# Patient Record
Sex: MALE | Race: OTHER
[De-identification: names, ages, dates, MRNs, and addresses within clinical notes are randomized per-mention and may not be internally consistent; named-entity substitution may affect disease eponyms.]

---

## 2019-09-19 PROBLEM — Z00.00 ENCOUNTER FOR PREVENTIVE HEALTH EXAMINATION: Status: ACTIVE | Noted: 2019-09-19

## 2019-09-23 ENCOUNTER — APPOINTMENT (OUTPATIENT)
Dept: RHEUMATOLOGY | Facility: CLINIC | Age: 44
End: 2019-09-23

## 2019-10-04 ENCOUNTER — OUTPATIENT (OUTPATIENT)
Dept: OUTPATIENT SERVICES | Facility: HOSPITAL | Age: 44
LOS: 1 days | End: 2019-10-04
Payer: COMMERCIAL

## 2019-10-04 ENCOUNTER — APPOINTMENT (OUTPATIENT)
Dept: RADIOLOGY | Facility: CLINIC | Age: 44
End: 2019-10-04
Payer: COMMERCIAL

## 2019-10-04 DIAGNOSIS — Z00.8 ENCOUNTER FOR OTHER GENERAL EXAMINATION: ICD-10-CM

## 2019-10-04 PROCEDURE — 73120 X-RAY EXAM OF HAND: CPT

## 2019-10-04 PROCEDURE — 73120 X-RAY EXAM OF HAND: CPT | Mod: 26,50

## 2020-11-08 ENCOUNTER — EMERGENCY (EMERGENCY)
Facility: HOSPITAL | Age: 45
LOS: 0 days | Discharge: HOME | End: 2020-11-08
Attending: EMERGENCY MEDICINE
Payer: MEDICAID

## 2020-11-08 VITALS
HEART RATE: 80 BPM | OXYGEN SATURATION: 98 % | DIASTOLIC BLOOD PRESSURE: 77 MMHG | RESPIRATION RATE: 16 BRPM | SYSTOLIC BLOOD PRESSURE: 134 MMHG

## 2020-11-08 VITALS — HEIGHT: 65 IN | WEIGHT: 169.98 LBS

## 2020-11-08 DIAGNOSIS — R50.9 FEVER, UNSPECIFIED: ICD-10-CM

## 2020-11-08 DIAGNOSIS — M79.10 MYALGIA, UNSPECIFIED SITE: ICD-10-CM

## 2020-11-08 DIAGNOSIS — R11.2 NAUSEA WITH VOMITING, UNSPECIFIED: ICD-10-CM

## 2020-11-08 DIAGNOSIS — U07.1 COVID-19: ICD-10-CM

## 2020-11-08 LAB — SARS-COV-2 RNA SPEC QL NAA+PROBE: DETECTED

## 2020-11-08 PROCEDURE — U0003: CPT

## 2020-11-08 PROCEDURE — 99284 EMERGENCY DEPT VISIT MOD MDM: CPT

## 2020-11-08 PROCEDURE — 93010 ELECTROCARDIOGRAM REPORT: CPT

## 2020-11-08 PROCEDURE — 71045 X-RAY EXAM CHEST 1 VIEW: CPT | Mod: 26

## 2020-11-08 PROCEDURE — 71045 X-RAY EXAM CHEST 1 VIEW: CPT

## 2020-11-08 PROCEDURE — 99283 EMERGENCY DEPT VISIT LOW MDM: CPT | Mod: 25

## 2020-11-08 PROCEDURE — 93005 ELECTROCARDIOGRAM TRACING: CPT

## 2020-11-08 RX ORDER — IBUPROFEN 200 MG
1 TABLET ORAL
Qty: 20 | Refills: 0
Start: 2020-11-08 | End: 2020-11-12

## 2020-11-08 RX ORDER — ACETAMINOPHEN 500 MG
2 TABLET ORAL
Qty: 32 | Refills: 0
Start: 2020-11-08 | End: 2020-11-11

## 2020-11-08 RX ORDER — IBUPROFEN 200 MG
600 TABLET ORAL ONCE
Refills: 0 | Status: COMPLETED | OUTPATIENT
Start: 2020-11-08 | End: 2020-11-08

## 2020-11-08 RX ORDER — ONDANSETRON 8 MG/1
1 TABLET, FILM COATED ORAL
Qty: 14 | Refills: 0
Start: 2020-11-08 | End: 2020-11-14

## 2020-11-08 RX ORDER — ACETAMINOPHEN 500 MG
1000 TABLET ORAL ONCE
Refills: 0 | Status: COMPLETED | OUTPATIENT
Start: 2020-11-08 | End: 2020-11-08

## 2020-11-08 RX ADMIN — Medication 1000 MILLIGRAM(S): at 16:01

## 2020-11-08 RX ADMIN — Medication 600 MILLIGRAM(S): at 16:01

## 2020-11-08 NOTE — ED ADULT NURSE NOTE - OBJECTIVE STATEMENT
Patient complains of fever and body aches without other complaints x 5 days.  No acute distress, pt smiling, cooperative.

## 2020-11-08 NOTE — ED PROVIDER NOTE - CLINICAL SUMMARY MEDICAL DECISION MAKING FREE TEXT BOX
Patient with subjective feevr x4 days with body aches and intermittent vomiting. Likely viral syndrome. EKG performed prior to MD evaluation. Told patient that he needs to isolate himself until COVID results are back. Well appearing. Patient with subjective fever x4 days with body aches and intermittent vomiting. Likely viral syndrome. EKG performed prior to MD evaluation. infiltrates on cxr suggestive of covid but pt w/o PNA symptoms. . normal 02 sat at rest & exertion. no cp, no sob. Told patient that he needs to isolate himself until COVID results are back. Well appearing.

## 2020-11-08 NOTE — ED PROVIDER NOTE - NSFOLLOWUPINSTRUCTIONS_ED_ALL_ED_FT
FOLLOW UP WITH PMD  WITHIN 1-2DAYS, CALL TO MAKE APPOINTMENT  COME BACK TO ED IF YOUR CONDITION WORSENS OR IF YOU DEVELOP: FEVER GREATER THAN 105F, fever for more than 5days straight, not being able to drink liquids, CHEST PAIN,  SHORTNESS OF BREATH OR ANY OTHER SYMPTOMS CONCERNING TO YOU  TAKE TYLENOL (ACETAMINOPHEN)  EVERY 6 HOURS AS NEEDED FOR PAIN OR FEVER  TAKE IBUPROFEN (MOTRIN)   EVERY 6 HOURS AS NEEDED FOR PAIN OR FEVER  IF YOU WERE PRESRCIBED ANY MEDICATIONS FROM TODAY'S VISIT, TAKE THEM AS DIRECTED     SEGUIMIENTO CON PMD DENTRO DE 1-2 DÍAS, LLAME PARA HACER JULIANA LATHA  VUELVA A Emergencia si grover condición empeora o si desarrolla: fiebre superior a 105F, fiebre por más de 5 días seguidos, no puede beber líquid,os DOLOR DE PECHO, DIFICULTA AL RESPIRAL O CUALQUIER OTRO SÍNTOMA RELACIONADO CON USTED  TOME TYLENOL (ACETAMINOPHEN)CADA 6 HORAS SEGÚN NECESIDAD DE DOLOR O FIEBRE  BRAYAN IBUPROFENO (MOTRIN)  CADA 6 HORAS SEGÚN NECESIDAD DE DOLOR O FIEBRE  SI FUISTES RECETADO CUALQUIER MEDICAMENTO DE LA VISITA DE BRITT, Tómelos jenny se le indique     ¿Qué es el COVID-19?  La enfermedad por coronavirus 2019, o "COVID-19", es juliana infección ocasionada por un virus específico llamado SARS-CoV-2. El virus apareció por primera vez a fines de 2019 en [place], fred desde entonces se ha propagado velozmente y ahora hay casos en muchos otros lugares, jenny [place] y [place].    Las personas que padecen COVID-19 pueden tener fiebre, tos y dificultad para respirar. Los problemas para respirar aparecen cuando la infección afecta a los pulmones y causa neumonía (figura 1).    Los expertos están estudiando ashley virus y seguirán descubriendo información a medida que pase el tiempo.    ¿Cómo se contagia el COVID-19?  El COVID-19 se contagia principalmente de juliana persona a otra, de manera similar a la gripe. En general, esto ocurre cuando juliana persona enferma tose o estornuda cerca de otras personas. Los médicos también creen que es posible contagiarse al tocar juliana superficie en la que se encuentra el virus y luego tocarse la boca, la nariz o los ojos.    Según los conocimientos de los expertos hasta el momento, al parecer el COVID-19 se contagia más fácilmente cuando hay síntomas presentes. También se puede contagiar sin síntomas, fred los expertos no saben qué brennan común es esto.    ¿Cuáles son los síntomas del COVID-19?  Los síntomas suelen comenzar algunos días después de que la persona se infecta con el virus, fred en algunos casos pueden tardar aún más en aparecer.    Estos pueden ser algunos de los síntomas:    ?Fiebre    ?Tos    ?Sensación de cansancio    ?Dificultad para respirar    ?Angelina musculares    La mayoría de las personas tienen síntomas leves, mientras que algunas personas no tienen ningún síntoma. Sin embargo, en otras personas el COVID-19 puede causar problemas graves jenny neumonía, falta de oxígeno o incluso la muerte. Candelaria Arenas es más común en personas de edad avanzada o que tienen otros problemas de liudmila.    Si derek los niños pueden contraer COVID-19, al parecer es menos probable que tengan síntomas graves.    ¿Yohana consultar a un médico o enfermero?  Si tiene fiebre, tos o dificultad para respirar, y es posible que haya estado expuesto al COVID-19, llame al médico o enfermero. Podría haberse expuesto si ocurrió cualquiera de las siguientes situaciones en los últimos 14 días:    ?Tuvo contacto directo con juliana persona que tiene el virus – Por lo general, esto significa estar a juliana distancia de aproximadamente 6 pies (1,8 metros) de la persona afectada.    ?Vivió en juliana xu donde hay muchas personas que tienen el virus, o viajó a alguna de esas zonas – Los Centros para el Control y la Prevención de Enfermedades ([facility]) de [place] tienen información sobre las zonas afectadas, la cual se puede consultar en grover sitio web: www.cdc.gov/coronavirus/2019-ncov/travelers/index.html.    ?Asistió a un evento o lugar donde hubo casos confirmados de COVID-19 – Por ejemplo, si muchas personas se enfermaron después de asistir a determinada reunión o en grover lugar de trabajo, es posible que usted se haya expuesto.    Si elizabeth síntomas no son graves, es mejor que llame a grover médico, enfermero o clínica antes de ir allí. Ellos le dirán qué hacer y si deben atenderlo en persona. Si necesita ir a la clínica u hospital, tendrá que ponerse juliana máscara. Además, es posible que el personal sanitario le pida que espere en un sitio apartado de otras personas.    Incluso si está gravemente enfermo y tiene que ir a la clínica u [facility], es conveniente que llame antes. De ivan modo, el personal sanitario podrá atenderlo y también xi las medidas necesarias para proteger a los demás.    Grover médico o enfermero le hará un examen y le preguntará sobre elizabeth síntomas. También le hará preguntas sobre los viajes que haya hecho recientemente y si ha estado cerca de alguna persona que podría estar enferma.    ¿Es necesario que me realice pruebas?  Si el médico o enfermero sospecha que usted tiene COVID-19, tomará juliana muestra de fluido del interior de grover nariz y posiblemente de grover boca, y la enviará a un laboratorio para que le jan pruebas. Si tiene tos con moco, también podrían hacerse pruebas con juliana muestra del moco. Estas pruebas permiten determinar si tiene COVID-19 u otra infección.    Además, posiblemente el médico le pida juliana radiografía de tórax o juliana tomografía (TAC) para revisar elizabeth pulmones.    ¿Cómo se trata el COVID-19?  No existe un tratamiento específico para el COVID-19. Muchas personas podrán recuperarse en grover casa, fred aquellas que tengan síntomas graves u otros problemas de liudmila posiblemente tengan que ir al hospital:    ?Enfermedad leve – La mayoría de las personas que tienen el COVID-19 solo se enferman levemente y pueden hacer reposo en grover casa hasta mejorarse. Las personas que tienen síntomas leves parecen mejorar en aproximadamente 2 semanas, fred cada crystal es distinto.    Si se está recuperando del COVID-19, es importante que se quede en grover casa hasta que grover médico o enfermero le diga que es seguro que retome elizabeth actividades normales. La decisión dependerá de cuándo haya comenzado con los síntomas y, en algunos casos, de si se hizo la prueba y el resultado fue negativo (lo que indica que ya no tiene el virus en el cuerpo).    ?Enfermedad grave – Si se enferma más gravemente, es posible que deba recibir tratamiento en el hospital, quizás en la unidad de cuidados intensivos. Mientras esté allí, lo más probable es que permanezca en juliana habitación de "aislamiento" especial. Solo el personal médico tendrá acceso a la habitación, y deberá usar batas, guantes, máscaras y protección ocular especiales.    Los médicos y enfermeros pueden supervisar y complementar la respiración y otras funciones del cuerpo, y darle la mayor comodidad posible. Nathanael vez necesite más oxígeno para que lo ayude a respirar fácilmente. Si tiene mucha dificultad para respirar, quizás sea necesario que lo conecten a un respirador, que es juliana máquina para ayudarlo a respirar.    Los médicos están estudiando varias medicinas para determinar si podrían servir para tratar el COVID-19. En ciertos casos, los médicos podrían recomendar esas medicinas.    ¿Se puede prevenir el COVID-19?  Hay ciertas cosas que puede hacer para disminuir elizabeth posibilidades de contagiarse el COVID-19. Estas medidas son recomendables para todos, en especial ahora que la infección se está propagando muy rápidamente, fred son aun más importantes para las personas mayores de 65 años o con otros problemas de liudmila. Para ayudar a desacelerar la propagación de la infección:    ?Lávese las verenice con agua y jabón frecuentemente. Candelaria Arenas es particularmente importante después de estar en público y tocar a otras personas o superficies. Asegúrese de refregarse las verenice con jabón eleno un mínimo de 20 segundos, y de limpiarse las muñecas, las uñas y la piel entre los dedos. Luego, enjuáguese las verenice y séquelas con juliana toalla de papel que pueda tirar a la basura.    Si no tiene un lavabo cerca, puede limpiarse con un gel para verenice. Los más efectivos son aquellos que contienen un mínimo de 60 % de alcohol, fred lo mejor es lavarse las verenice con jabón y agua, si es posible.    ?Evite tocarse la shari con las verenice, especialmente la boca, la nariz o los ojos.    ?Intente mantenerse lejos de personas que presenten cualquiera de los síntomas de la infección.    ?Evite las multitudes. Si vive en juliana xu donde ha habido casos de COVID-19, trate de quedarse en casa el mayor tiempo posible.    Incluso si no está enfermo, limitar el contacto con otras personas puede ayudar a desacelerar la propagación de la enfermedad. Los expertos lo llaman "distanciamiento social". En general, se recomienda cancelar o postergar las grandes reuniones de personas, jenny los eventos deportivos, los conciertos, los festivales, los desfiles y las bodas. Sin embargo, las reuniones menos concurridas también pueden ser peligrosas. Si necesita estar cerca de otras personas, asegúrese de lavarse las verenice con frecuencia y de evitar el contacto cuando pueda. Por ejemplo, puede evitar los apretones de mano y los choques de bess, y animar a otros a que jan lo mismo.    ?Algunos expertos recomiendan no viajar a determinados países donde hay muchos casos de COVID-19. Las recomendaciones en cuanto a los viajes cambian con frecuencia.    Los expertos no recomiendan que use juliana máscara si no está enfermo, catrina que tenga a grover cuidado a juliana persona que tiene el COVID-19 (o podría tenerlo).    Si vive con alguien que tiene el COVID-19, hay algunas cosas más que puede hacer para protegerse a sí mismo y a los demás:    ?Mantenga al enfermo alejado de otras personas – El enfermo debe tener grover propia habitación y grover propio baño, si es posible. También debe comer en grover propia habitación.    ?Use máscaras – El enfermo debe usar juliana máscara cuando esté en la misma habitación que otras personas. Si usted cuida del enfermo, también puede usar juliana máscara para protegerse cuando estén en la misma habitación. Candelaria Arenas es particularmente importante si la persona enferma no puede usar juliana máscara.    ?Lávese las verenice – Lávese las verenice con agua y jabón frecuentemente (alecia arriba).    ?Limpie con frecuencia – Estas son algunas cosas específicas que pueden ser de ayuda:    •Póngase guantes desechables para limpiar. También es conveniente que se ponga guantes para tocar la ropa sucia, los platos, los utensilios y los desechos de la persona enferma.    •Limpie con frecuencia los objetos que se tocan mucho, jenny las encimeras, las mesas de noche, los picaportes, las computadoras, los teléfonos y las superficies de los trey.    •Limpie los objetos de grover casa con agua y jabón, fred también use desinfectantes en las superficies adecuadas. Algunos productos de limpieza son efectivos para eliminar las bacterias fred no los virus, por lo que es importante leer las etiquetas. La Agencia de Protección Ambiental (Environmental Protection Agency, EPA) de [place] tiene juliana lista de productos aquí: www.epa.gov/pesticide-registration/list-n-disinfectants-use-against-sars-cov-2.    Todavía no existe ninguna vacuna para prevenir el COVID-19.    ¿Qué yohana hacer si hay un brote de COVID-19 en mi región?  Lo mejor que puede hacer para preservar grover liudmila es lavarse las verenice frecuentemente, evitar el contacto cercano con personas enfermas y quedarse en casa si está enfermo. Además, para ayudar a desacelerar la propagación de la enfermedad, es importante seguir todas las instrucciones oficiales que haya en grover región en cuanto a limitar el contacto entre personas. Incluso si no hay casos de COVID-19 donde usted vive, la situación podría cambiar más adelante.    Si hay un brote en grover xu, es probable que las escuelas o los negocios cierren temporalmente, y que muchos eventos se cancelen. Si eso ocurre, o si algún integrante de grover jose contrae COVID-19, es probable que deba quedarse en casa un tiempo. Hay cosas que puede hacer para prepararse para pham posibilidad. Por ejemplo, podría preguntar a grover empleador si puede trabajar desde grover casa o xi juliana licencia, en crystal de ser necesario. Además, puede asegurarse de tener algún modo de contactar a elizabeth parientes, vecinos y otras personas de grover xu para poder intercambiar información fácilmente.    Las normas y las pautas podrían variar de juliana xu a otra. Si los funcionarios de grover xu indican a los habitantes que se queden en grover casa o eviten reunirse con otras personas, es importante xi en suma esta indicación y seguir las instrucciones en la mayor medida posible. Incluso si no corre un riesgo alto de enfermarse gravemente de COVID-19, podría contagiar a otras personas. Mantener la distancia entre las personas es juliana de las mejores formas de controlar la transmisión del virus.    Si usted u otros integrantes de grover jose sienten ansiedad con respecto al COVID-19, recuerde que la mayoría de la gente no se enferma gravemente ni muere a causa de la enfermedad. Si derek es útil estar preparado y hay cosas que puede hacer para disminuir grover riesgo y ayudar a desacelerar la propagación del virus, trate de no entrar en pánico.    ¿Dónde puedo obtener más información?  A medida que se sepa más sobre ashley virus, las recomendaciones de los expertos seguirán cambiando. Consulte a grover médico o funcionario de liudmila pública para contar con la información más actualizada acerca de cómo protegerse.    También puede encontrar más información sobre el COVID-19 en los siguientes sitios web:    ?Centros para el Control y la Prevención de Enfermedades de [place] (CDC): www.cdc.gov/COVID19    ?Organización Mundial de la Liudmila (OMS): www.who.int/emergencies/diseases/novel-coronavirus-2019

## 2020-11-08 NOTE — ED PROVIDER NOTE - OBJECTIVE STATEMENT
Pertinent HPI/PMH/PSH/FHx/SHx and Review of Systems contained within  HPI:  Patient p/w CC of new onset subjective fever with associated body aches, intermittent N/V, gradual onset HA x4 days. Last took Motrin last night. Patient has been tolerating PO intake today. Came to the ED today for further evaluation.   PMH/PSH relevant for:  None   ROS negative for: fever, Chest pain, SOB,  diarrhea, abdominal pain, dysuria    FamilyHx and SocialHx not otherwise contributory Pertinent HPI/PMH/PSH/FHx/SHx and Review of Systems contained within  HPI:  Patient p/w CC of new onset subjective fever with associated body aches, intermittent N/V, gradual onset HA x4 days. Last took Motrin last night. Patient has been tolerating PO intake today. Came to the ED today for further evaluation.   PMH/PSH relevant for:  None   ROS negative for: Chest pain, SOB,  diarrhea, abdominal pain, dysuria, cough, sore throat  FamilyHx and SocialHx not otherwise contributory

## 2020-11-08 NOTE — ED PROVIDER NOTE - CCCP TRG CHIEF CMPLNT
Cyril Bloom. is a 77 y.o. male who presents for routine immunizations. He denies any symptoms , reactions or allergies that would exclude them from being immunized today. Risks and adverse reactions were discussed and the VIS was given to them. All questions were addressed. He was observed for 10 min post injection. There were no reactions observed.     Jessica Shetty LPN fever

## 2020-11-08 NOTE — ED PROVIDER NOTE - PATIENT PORTAL LINK FT
You can access the FollowMyHealth Patient Portal offered by [facility] by registering at the following website: [email or web address]. By joining [employer]’s FollowMyHealth portal, you will also be able to view your health information using other applications (apps) compatible with our system.

## 2020-11-08 NOTE — ED ADULT NURSE NOTE - CHIEF COMPLAINT QUOTE
to ed from home for fevers x4 days and body aches. Denies chest pain, sob, and cough. Known exposure.  Denies travel. Denies pain. Denies taking tylenol, reports taking Advil last night.  id @[id number]

## 2020-11-08 NOTE — ED PROVIDER NOTE - NS ED ROS FT
Review of Systems:  	•	CONSTITUTIONAL: +subjective fever  +body aches   	•	SKIN: no rash  	•	RESPIRATORY: no shortness of breath  	•	CARDIAC: no chest pain, no palpitations  	•	GI:  no abd pain,  +nausea, +vomiting, no diarrhea  	•	GENITO-URINARY:  no dysuria; no hematuria    	•	MUSCULOSKELETAL:  no back pain  	•	NEUROLOGIC: no weakness +HA   	•	ALLERGY: no rhinitis  	•	PSYCHIATRIC: no anxiety

## 2020-11-08 NOTE — ED ADULT TRIAGE NOTE - CHIEF COMPLAINT QUOTE
to ed from home for fevers x4 days and body aches. Denies chest pain, sob, and cough. Known exposure.  Denies travel. Denies pain. Denies taking tylenol, reports taking Advil last night.  id @[id number]

## 2020-11-08 NOTE — ED PROVIDER NOTE - CARE PLAN
Principal Discharge DX:	Viral syndrome  Secondary Diagnosis:	Fever  Secondary Diagnosis:	Infiltrate of lung present on chest x-ray

## 2020-11-21 ENCOUNTER — OUTPATIENT (OUTPATIENT)
Dept: OUTPATIENT SERVICES | Facility: HOSPITAL | Age: 45
LOS: 1 days | End: 2020-11-21
Payer: MEDICARE

## 2020-11-21 DIAGNOSIS — Z11.59 ENCOUNTER FOR SCREENING FOR OTHER VIRAL DISEASES: ICD-10-CM

## 2020-11-21 PROBLEM — Z78.9 OTHER SPECIFIED HEALTH STATUS: Chronic | Status: ACTIVE | Noted: 2020-11-08

## 2020-11-21 LAB — SARS-COV-2 RNA SPEC QL NAA+PROBE: SIGNIFICANT CHANGE UP

## 2020-11-21 PROCEDURE — U0003: CPT

## 2020-11-22 DIAGNOSIS — Z11.59 ENCOUNTER FOR SCREENING FOR OTHER VIRAL DISEASES: ICD-10-CM

## 2021-04-12 ENCOUNTER — EMERGENCY (EMERGENCY)
Facility: HOSPITAL | Age: 46
LOS: 0 days | Discharge: HOME | End: 2021-04-12
Attending: EMERGENCY MEDICINE
Payer: MEDICAID

## 2021-04-12 VITALS
RESPIRATION RATE: 17 BRPM | DIASTOLIC BLOOD PRESSURE: 79 MMHG | SYSTOLIC BLOOD PRESSURE: 109 MMHG | HEART RATE: 59 BPM | OXYGEN SATURATION: 100 % | TEMPERATURE: 98 F

## 2021-04-12 VITALS — HEIGHT: 65 IN | WEIGHT: 179.02 LBS

## 2021-04-12 DIAGNOSIS — E78.5 HYPERLIPIDEMIA, UNSPECIFIED: ICD-10-CM

## 2021-04-12 DIAGNOSIS — M54.9 DORSALGIA, UNSPECIFIED: ICD-10-CM

## 2021-04-12 DIAGNOSIS — M51.26 OTHER INTERVERTEBRAL DISC DISPLACEMENT, LUMBAR REGION: ICD-10-CM

## 2021-04-12 PROCEDURE — 99284 EMERGENCY DEPT VISIT MOD MDM: CPT

## 2021-04-12 PROCEDURE — 96372 THER/PROPH/DIAG INJ SC/IM: CPT

## 2021-04-12 PROCEDURE — 72131 CT LUMBAR SPINE W/O DYE: CPT | Mod: 26

## 2021-04-12 PROCEDURE — 99284 EMERGENCY DEPT VISIT MOD MDM: CPT | Mod: 25

## 2021-04-12 PROCEDURE — 72131 CT LUMBAR SPINE W/O DYE: CPT

## 2021-04-12 RX ORDER — LIDOCAINE 4 G/100G
1 CREAM TOPICAL
Qty: 14 | Refills: 0
Start: 2021-04-12

## 2021-04-12 RX ORDER — CYCLOBENZAPRINE HYDROCHLORIDE 10 MG/1
1 TABLET, FILM COATED ORAL
Qty: 15 | Refills: 0
Start: 2021-04-12

## 2021-04-12 RX ORDER — KETOROLAC TROMETHAMINE 30 MG/ML
30 SYRINGE (ML) INJECTION ONCE
Refills: 0 | Status: DISCONTINUED | OUTPATIENT
Start: 2021-04-12 | End: 2021-04-12

## 2021-04-12 RX ORDER — LIDOCAINE 4 G/100G
1 CREAM TOPICAL ONCE
Refills: 0 | Status: COMPLETED | OUTPATIENT
Start: 2021-04-12 | End: 2021-04-12

## 2021-04-12 RX ORDER — OXYCODONE AND ACETAMINOPHEN 5; 325 MG/1; MG/1
1 TABLET ORAL EVERY 4 HOURS
Refills: 0 | Status: DISCONTINUED | OUTPATIENT
Start: 2021-04-12 | End: 2021-04-12

## 2021-04-12 RX ADMIN — Medication 50 MILLIGRAM(S): at 12:18

## 2021-04-12 RX ADMIN — LIDOCAINE 1 PATCH: 4 CREAM TOPICAL at 13:39

## 2021-04-12 RX ADMIN — Medication 30 MILLIGRAM(S): at 12:18

## 2021-04-12 RX ADMIN — OXYCODONE AND ACETAMINOPHEN 1 TABLET(S): 5; 325 TABLET ORAL at 12:18

## 2021-04-12 NOTE — ED STATDOCS - PATIENT PORTAL LINK FT
You can access the FollowMyHealth Patient Portal offered by [facility] by registering at the following website: [email or web address]. By joining [facility]’s FollowMyHealth portal, you will also be able to view your health information using other applications (apps) compatible with our system.

## 2021-04-12 NOTE — ED STATDOCS - OBJECTIVE STATEMENT
Pt speaks Haitian, provider acted as . 46 y/o male with PMHX of HLD presents to the ED c/o back pain. Pt states he fell on 12/2020 on ice, injured back with no LOC or head strike. Pt states that pain back in 12/2020 improved. Pt reports sudden onset episode of back pain since yesterday. Denies any injuries, trauma, or fall prior to yesterday's back pain. Denies bladder or bowel incontinence, fevers, chills, numbness, tingling, or weakness to BLE.

## 2021-04-12 NOTE — ED STATDOCS - PROGRESS NOTE DETAILS
Pain improved after meds. Waiting on CT read. ~Dheeraj Holly PA-C PA note: All studies reviewed in details with patient. Patient re-examined and re-evaluated. Patient feels much better at this time. ED evaluation, Diagnosis and management discussed with the patient in detail. Workup results discussed with ED attending, OK to [place] home. MRI & close ORTHO SPINE Dr follow up encouraged.  Strict ED return instructions discussed in detail and patient given the opportunity to ask any questions about their discharge diagnosis and instructions. Patient verbalized understanding. ~ Dheeraj Holly PA-C PA: Patient is a 45 year old male with PMHx of back pain who presents to [facility] c/o worsening lower back pain since he fell on ice in DEC, 2020. Patient states he fell on 12/2020 on ice, injured his lower back with no LOC or head strike. Pt states that pain back in 12/2020 improved. Pt reports sudden onset episode of back pain since yesterday. DENIES new injuries, trauma, or fall prior to yesterday's back pain. Denies bladder or bowel incontinence, fevers, chills, numbness, tingling, or weakness to BLE. ~Dheeraj Holly PA-C   Patient seen and evaluated in . Will get CT L-spine, pain control. Reassess. ~Dheeraj Holly PA-C

## 2021-04-12 NOTE — ED ADULT TRIAGE NOTE - CHIEF COMPLAINT QUOTE
Pt comes to the ED complaining of back pain. Pt states that he fell in December at work and injured his back then and was seen here. Pt states that overtime the back pain has gotten progressively worse. Pt states that he is unable to put his shoes on secondary to the pain.

## 2021-04-12 NOTE — ED STATDOCS - NSFOLLOWUPINSTRUCTIONS_ED_ALL_ED_FT
ACUTE LOW BACK PAIN - Ambulatory Care           Dolor antonieta de la parte inferior de la espalda    CUIDADO AMBULATORIO:    El dolor antonieta de la región lumbar de la espaldaes juliana molestia repentina que dura hasta 6 semanas y que dificulta la actividad.    Los síntomas más comunes incluyen los siguientes:  •Rigidez o espasmos      •Dolor hacia abajo o en un lado de corona pierna      •Mantenerse en juliana posición o postura inusual para disminuir el dolor en corona espalda      •No poder encontrar juliana posición cómoda mientras está sentado      •Poco a poco aumento del dolor por 24 o 48 horas después de ejercer tensión en corona espalda      •Molestia en el lumbago o dolor intenso cuando mueve corona espalda      Busque atención médica de inmediato si:  •Usted tiene dolor intenso.      •Usted repentinamente tiene rigidez o siente pesadez en ambos glúteos hacia abajo de ambas piernas.      •Usted tiene entumecimiento o debilidad en juliana pierna o dolor en ambas piernas.      •Usted tiene entumecimiento en el área genital o en la región lumbar.      •Usted no puede controlar corona orina ni elizabeth deposiciones intestinales.      Llame a corona médico si:  •Tiene fiebre.      •Usted tiene un dolor por la noche o cuando descansa.      •Corona dolor no mejora con el tratamiento.      •Usted tiene dolor que empeora cuando tose o estornuda.      •Usted siente un estallido o chasquido repentino en corona espalda.      •Usted tiene preguntas o inquietudes acerca de corona condición o cuidado.      La meta del tratamiento para el dolor de la parte inferior de la espaldaes aliviar el dolor y ayudarlo para que pueda realizar elizabeth actividades habituales. La mayoría de las personas que tienen dolor antonieta de la parte inferior de la espalda se mejoran entre unas 4 a 6 semanas. Es posible que usted necesite alguno de los siguientes:  •Los SANTA,jenny el ibuprofeno, ayudan a disminuir la inflamación, el dolor y la fiebre. Lissett medicamento está disponible con o sin juliana receta médica. Los SANTA pueden causar sangrado estomacal o problemas renales en ciertas personas. Si usted anjelica un medicamento anticoagulante, siempre pregúntele a corona médico si los SANTA son seguros para usted. Siempre ganesh la etiqueta de lissett medicamento y siga las instrucciones.      •Acetaminofénalivia el dolor y baja la fiebre. Está disponible sin receta médica. Pregunte la cantidad y la frecuencia con que debe tomarlos. Siga las indicaciones. Ganesh las etiquetas de todos los demás medicamentos que esté usando para saber si también contienen acetaminofén, o pregunte a corona médico o farmacéutico. El acetaminofén puede causar daño en el hígado cuando no se anjelica de forma correcta. No use más de 4 gramos (4000 miligramos) en total de acetaminofeno en un día.      •Relajantes muscularesdisminuyen el dolor y relajan los músculos de la parte inferior de la columna.      •Puede administrarsepodrían administrarse. Pregunte al médico cómo debe xi lissett medicamento de forma simmons. Algunos medicamentos recetados para el dolor contienen acetaminofén. No tome otros medicamentos que contengan acetaminofén sin consultarlo con corona médico. Demasiado acetaminofeno puede causar daño al hígado. Los medicamentos recetados para el dolor podrían causar estreñimiento. Pregunte a corona médico jenny prevenir o tratar estreñimiento.      El manejo de elizabeth síntomas:  •Manténgase activolo más que pueda sin causar más dolor. El reposo en cama puede empeorar corona dolor de espalda. Comience con ejercicios ligeros jenny caminar. Evite levantar objetos hasta que ya no tenga dolor. Solicite más información acerca de las actividades físicas o plan de ejercicios que son los adecuados para usted.  Deep diversa caminando jenny ejercicio           •Aplique caloren la espalda de 20 a 30 minutos cada 2 horas por los días que le indiquen. El calor ayuda a disminuir el dolor y los espasmos musculares. Alterne entre el calor y el hielo.      •Aplique hieloen la espalda de 15 a 20 minutos cada hora o jenny se le indique. Use juliana compresa de hielo o ponga hielo triturado en juliana bolsa de plástico. Cúbralo con juliana toalla antes de aplicarlo sobre corona piel. El hielo ayuda a evitar daño al tejido y a disminuir la inflamación y el dolor.      Prevenir el dolor antonieta de la parte inferior de la espalda:  •Use la mecánica corporal adecuada.?Flexione la cadera y las rodillas cuando vaya a levantar un objeto. No doble la cintura. Utilice los músculos de las piernas mientras levanta corona carga. No use corona espalda. Mantenga el objeto cerca de corona pecho mientras lo levanta. No se tuerza, ni levante cualquier cosa por encima de corona cintura.      ?Cambie corona posición frecuentemente cuando pase mucho tiempo de pie. Descanse un pie sobre juliana caja pequeña o un reposapiés e intercambie con el otro pie frecuentemente.      ?No permanezca sentado por lapsos de tiempo prolongados. Cuando sea necesario hacerlo, siéntese en juliana silla de respaldo recto con los pies apoyados en el suelo. Nunca alcance, jale ni empuje mientras se encuentra sentando.      •Preston ejercicios que fortalezcan elizabeth músculos de la espalda.Entre en calor antes de hacer ejercicio. Consulte con corona médico sobre el mejor plan de ejercicios para usted.      •Mantenga un peso saludable.Pregúntele a corona médico cuál es el peso ideal para usted. Pídale que lo ayude a crear un plan para bajar de peso si tiene sobrepeso.      Acuda a la consulta de control con corona médico según las indicaciones:Regrese a juliana joanna de seguimiento si usted aún tiene dolor después de 1 a 3 semanas de tratamiento. Nathanael vez necesite acudir con un ortopedista si corona dolor de espalda dura más de 12 semanas. Anote elizabeth preguntas para que se acuerde de hacerlas eleno elizabeth visitas.                  MAGNETIC RESONANCE IMAGING - General Information           Imagen por resonancia magnética    LO QUE NECESITA SABER:    ¿Qué necesito saber acerca de juliana imagen por resonancia magnética (IRM)?Juliana IRM es un examen que utiliza artis magnéticos y ondas de radio para xi imágenes del interior de corona cuerpo. Juliana IRM se utiliza para alecia los vasos sanguíneos, el tejido, los músculos y los huesos. También puede mostrar órganos, jenny corona corazón, pulmones o hígado. Juliana IRM puede ayudar a que corona médico diagnostique o trate juliana condición médica. No utiliza radiación.    ¿Cómo me preparo para juliana IRM?  •Corona médico le indicará los medicamentos que puede xi o no el día de la IRM. Le podría administrar medicamento para ayudarlo a sentirse calmado y relajado eleno la IRM.      •Informe a corona médico si usted sabe o piensa que está embarazada.      •Dígale a corona médico si usted tiene algún metal dentro de corona cuerpo, jenny un marcapasos, implante o pinza de aneurisma. Dígale si usted tiene un tatuaje o usa un parche medicinal.      •Quítese cualquier artículo de metal que lleve puesto, jenny broches para el sana, joyería, anteojos, audífonos o dientes postizos antes de entrar a la habitación de IRM.      ¿Qué sucederá eleno juliana IRM?Corona médico le pedirá que se acueste sobre juliana mayito. Podría usarse líquido de contraste para ayudar a que juliana parte de corona cuerpo se tyrese más claramente. La mayito será deslizada hacia un espacio abierto en el centro de la máquina. Usted tendrá que permanecer quieto eleno la IRM. Es normal escuchar ruidos de golpes, estruendos o de clics en la máquina.              ¿Cuáles son los riesgos de juliana IRM?Juliana IRM podría provocar que un objeto de metal en corona cuerpo se salga de corona lugar y provoque lesiones serias o que deje de funcionar apropiadamente. El líquido de contraste podría provocar náusea, dolor de lurdes, desvanecimiento o dolor en el área de la inyección. Usted también podría sufrir juliana reacción alérgica al líquido de contraste.    ACUERDOS SOBRE CORONA CUIDADO:    Usted tiene el derecho de ayudar a planear corona cuidado. Aprenda todo lo que pueda sobre corona condición y jenny darle tratamiento. Discuta elizabeth opciones de tratamiento con elizabeth médicos para decidir el cuidado que usted desea recibir. Usted siempre tiene el derecho de rechazar el tratamiento. Hernia de disco    Herniated Disk       Se produce juliana hernia de disco cuando un disco de la columna vertebral sobresale. Hay un disco ovalado entre cada par de huesos (vértebras) de la columna vertebral. Los discos conectan los huesos, ayudan a que la columna vertebral se mueva y evitan que los huesos se rocen entre sí al moverse.    Juliana hernia de disco puede producirse en cualquier parte de la espalda o del zoe. Angelique siempre afecta la parte inferior de la espalda.      ¿Cuáles son las causas?  Esta afección puede ser causada por lo siguiente:  •Desgaste a medida que envejece.      •Lesión repentina, jenny juliana distensión o un esguince.        ¿Qué incrementa el riesgo?  Los siguientes factores pueden hacer que sea más propenso a desarrollar esta afección:  •Envejecimiento. Lissett es el factor principal que aumenta el riesgo.      •Ser un hombre de 30 a 50 años de edad.      •Realizar actividades en las que tenga que levantar peso, inclinarse o girar con frecuencia.      •No hacer suficiente ejercicio físico.      •Tener sobrepeso.      •Usar productos que contienen tabaco.        ¿Cuáles son los signos o síntomas?  Los síntomas pueden variar según el lugar del cuerpo donde esté la hernia de disco.•Si la hernia está en la parte inferior de la espalda, quizá sienta un dolor antonieta en las siguientes zonas:  •Parte del brazo, la pierna, la cadera o las nalgas.      •Danielle posterior de la parte inferior de la pierna (pantorrilla).      •Parte inferior de la espalda que se extiende por la pierna hasta el pie (ciática).      •Juliana hernia de disco en el zoe puede hacer que se sienta mareado y que sienta que las cosas que lo rodean se mueven cuando en realidad no lo hacen (vértigo). También podría provocar dolor o debilidad en las siguientes zonas:  •El zoe.      •Los omóplatos.      •La parte superior del brazo, la parte inferior del brazo o los dedos.      También puede tener debilidad muscular. Puede ser difícil:  •Levantar la pierna o el brazo.      •Pararse en puntas de pie.      •Apretar con fuerza con juliana de las verenice.    Otros síntomas pueden incluir:  •Pérdida de la sensibilidad (adormecimiento) u hormigueo en las zonas afectadas de las verenice, los brazos, los pies o las piernas.      •No poder contolar cuando hace pis (orina) o cuando defeca (deposiciones). Velma es poco frecuente fred grave. Es posible que tenga juliana hernia de disco muy grave en la parte inferior de la espalda.        ¿Cómo se trata?  El tratamiento de esta afección puede incluir:•Un corto período de reposo. Por lo general, lissett es el primer tratamiento.  •Podría tener que hacer reposo en cama eleno hasta 2 días, o podrían indicarle que se quede en corona casa y evite realizar actividad.      •Si tiene juliana hernia de disco en la parte inferior de la espalda, limite el tiempo que está sentado. Al estar sentado, se ejerce más presión sobre el disco.      •Medicamentos. Estos medicamentos pueden incluir:  •Antiinflamatorios no esteroideos (SANTA), jenny ibuprofeno, para ayudar a reducir el dolor y la hinchazón.      •Relajantes musculares para que los músculos de la espalda no se tensen de repente (espasmo).      •Analgésicos recetados, si tiene un dolor muy intenso.        •Terapia con hielo o con calor.      •Inyecciones de corticoesteroides en la danielle de la hernia de disco. Estos ayudan a reducir la hinchazón y el dolor.      •Fisioterapia para fortalecer los músculos de la espalda.      En muchos casos, los síntomas desaparecen con el tratamiento después de algunos días o semanas. Lo más probable es que después de 3 o 4 meses ya no tenga síntomas. Si otros tratamientos no resultan eficaces, brenden vez deba someterse a juliana cirugía.      Siga estas instrucciones en corona casa:    Medicamentos     •Use los medicamentos de venta sachin y los recetados solamente jenny se lo haya indicado el médico.    •Consulte a corona médico si el medicamento que le recetaron:  •Hace necesario que evite conducir o usar maquinaria pesada.    •Puede causarle dificultad para defecar (estreñimiento). Es posible que deba xi medidas para prevenir o tratar los problemas para defecar:  •Beber suficiente líquido para mantener el pis (la orina) de color amarillo pálido.      •Usar medicamentos recetados o de venta sachin.      •Valencia alimentos ricos en fibra. Entre ellos, frijoles, cereales integrales y frutas y verduras frescas.      •Limitar los alimentos con alto contenido de grasas y azúcares procesados. Estos incluyen alimentos fritos o dulces.            Control del dolor, la rigidez y la hinchazón                 •Si se lo indican, aplique hielo sobre la danielle dolorida. Para hacer esto:  •Ponga el hielo en juliana bolsa plástica.      •Coloque juliana toalla entre la piel y la bolsa.      •Coloque el hielo eleno 20 minutos, 2 a 3 veces por día.      •Si se lo indican, aplique calor sobre la danielle dolorida. Hágalo con la frecuencia que le haya indicado el médico. Use la genna de calor que el médico le recomiende, jenny juliana compresa de calor húmedo o juliana almohadilla térmica.  •Coloque juliana toalla entre la piel y la genna de calor.      •Aplique calor eleno 20 a 30 minutos.      •Retire la genna de calor si la piel se pone de color crews brillante. Velma es muy importante si no puede sentir dolor, calor o frío. Puede correr un riesgo mayor de sufrir quemaduras.        Actividad     •Preston reposo jenny se lo haya indicado el médico.    •Después del período de reposo:  •Retome elizabeth actividades normales según lo indicado por el médico. Comience a hacer ejercicios de a poco jenny se lo hayan indicado. Pregúntele al médico qué actividades y ejercicios son seguros para usted.      •Mantenga juliana buena postura.      •Evite los movimientos que le causen dolor.      •No levante ningún objeto que pese más de 10 libras (4.5 kg) o el límite de peso que le indiquen hasta que el médico le diga que puede hacerlo.      •No se siente ni esté de pie eleno un período prolongado sin moverse.      •No se siente eleno un período prolongado sin levantarse y moverse.        •Si le indicaron hacer ejercicios (fisioterapia), hágalos jenny se lo haya indicado el médico.      •Intente fortalecer la espalda y el vientre (abdomen) con ejercicios jenny nadar o caminar.      Instrucciones generales     • No consuma ningún producto que contenga nicotina o tabaco, jenny cigarrillos, cigarrillos electrónicos y tabaco de mascar. Si necesita ayuda para dejar de consumir estos productos, consulte al médico.      • No use zapatos con tacones altos.      • No duerma boca abajo.      •Si tiene sobrepeso, trabaje con el médico para adelgazar sin riegos.      •Concurra a todas visitas de seguimiento jenny se lo haya indicado el médico. Velma es importante.        ¿Cómo se previene?     •Mantenga un peso saludable.      •Mantenerse en forma. Preston por lo menos 150 minutos de ejercicios de intensidad moderada cada semana, jenny caminar rápidamente o hacer gimnasia acuática.    •Al levantar objetos:  •Mantenga los pies separados el ancho de los hombros o más.      •Endurezca los músculos del abdomen.      •Flexione las rodillas y la cadera, y mantenga la columna en posición neutral. Levante los objetos utilizando la fuerza de las piernas, no de la espalda. No trabe las rodillas hacia afuera.      •Siempre pida ayuda a otra persona para levantar objetos pesados o incómodos.          Comuníquese con un médico si:    •Tiene dolor de espalda o de zoe que no se jose después de 6 semanas.      •Tiene un dolor muy intenso en la espalda, el zoe, las piernas o los brazos.    •Tiene cualquiera de estos problemas en cualquier parte del cuerpo:  •Hormigueo.      •Debilidad.      •Pérdida de la sensibilidad.          Solicite ayuda de inmediato si:    •No puede  los brazos o las piernas.      •No puede controlar cuando hace pis o cuando defeca.      •Siente mareos.      •Se desmaya.      •Tiene dificultad para respirar.        Resumen    •Juliana hernia de disco se produce cuando un disco de la columna sobresale.      •Esta afección puede deberse al desgaste a medida que envejece o a juliana lesión repentina.      •Los síntomas pueden variar según el lugar del cuerpo donde esté la hernia de disco.      •El tratamiento puede incluir reposo, medicamentos, terapia con calor o hielo, inyecciones de corticoesteroides y ejercicios.      •Si otros tratamientos no son eficaces, brenden vez deba someterse a juliana cirugía.      Esta información no tiene jenny fin reemplazar el consejo del médico. Asegúrese de hacerle al médico cualquier pregunta que tenga.          ACUTE LOW BACK PAIN - Ambulatory Care       Dolor antonieta de la parte inferior de la espalda    CUIDADO AMBULATORIO:    El dolor antonieta de la región lumbar de la espaldaes juliana molestia repentina que dura hasta 6 semanas y que dificulta la actividad.    Los síntomas más comunes incluyen los siguientes:  •Rigidez o espasmos      •Dolor hacia abajo o en un lado de corona pierna      •Mantenerse en juliana posición o postura inusual para disminuir el dolor en corona espalda      •No poder encontrar juliana posición cómoda mientras está sentado      •Poco a poco aumento del dolor por 24 o 48 horas después de ejercer tensión en corona espalda      •Molestia en el lumbago o dolor intenso cuando mueve corona espalda      Busque atención médica de inmediato si:  •Usted tiene dolor intenso.      •Usted repentinamente tiene rigidez o siente pesadez en ambos glúteos hacia abajo de ambas piernas.      •Usted tiene entumecimiento o debilidad en juliana pierna o dolor en ambas piernas.      •Usted tiene entumecimiento en el área genital o en la región lumbar.      •Usted no puede controlar corona orina ni elizabeth deposiciones intestinales.      Llame a corona médico si:  •Tiene fiebre.      •Usted tiene un dolor por la noche o cuando descansa.      •Corona dolor no mejora con el tratamiento.      •Usted tiene dolor que empeora cuando tose o estornuda.      •Usted siente un estallido o chasquido repentino en corona espalda.      •Usted tiene preguntas o inquietudes acerca de corona condición o cuidado.      La meta del tratamiento para el dolor de la parte inferior de la espaldaes aliviar el dolor y ayudarlo para que pueda realizar elizabeth actividades habituales. La mayoría de las personas que tienen dolor antonieta de la parte inferior de la espalda se mejoran entre unas 4 a 6 semanas. Es posible que usted necesite alguno de los siguientes:  •Los SANTA,jenny el ibuprofeno, ayudan a disminuir la inflamación, el dolor y la fiebre. Lissett medicamento está disponible con o sin juliana receta médica. Los SANTA pueden causar sangrado estomacal o problemas renales en ciertas personas. Si usted anjelica un medicamento anticoagulante, siempre pregúntele a corona médico si los SANTA son seguros para usted. Siempre ganesh la etiqueta de lissett medicamento y siga las instrucciones.      •Acetaminofénalivia el dolor y baja la fiebre. Está disponible sin receta médica. Pregunte la cantidad y la frecuencia con que debe tomarlos. Siga las indicaciones. Ganesh las etiquetas de todos los demás medicamentos que esté usando para saber si también contienen acetaminofén, o pregunte a corona médico o farmacéutico. El acetaminofén puede causar daño en el hígado cuando no se anjelica de forma correcta. No use más de 4 gramos (4000 miligramos) en total de acetaminofeno en un día.      •Relajantes muscularesdisminuyen el dolor y relajan los músculos de la parte inferior de la columna.      •Puede administrarsepodrían administrarse. Pregunte al médico cómo debe xi lissett medicamento de forma simmons. Algunos medicamentos recetados para el dolor contienen acetaminofén. No tome otros medicamentos que contengan acetaminofén sin consultarlo con corona médico. Demasiado acetaminofeno puede causar daño al hígado. Los medicamentos recetados para el dolor podrían causar estreñimiento. Pregunte a corona médico jenny prevenir o tratar estreñimiento.      El manejo de elizabeth síntomas:  •Manténgase activolo más que pueda sin causar más dolor. El reposo en cama puede empeorar corona dolor de espalda. Comience con ejercicios ligeros jenny caminar. Evite levantar objetos hasta que ya no tenga dolor. Solicite más información acerca de las actividades físicas o plan de ejercicios que son los adecuados para usted.  Deep diversa caminando jenny ejercicio           •Aplique caloren la espalda de 20 a 30 minutos cada 2 horas por los días que le indiquen. El calor ayuda a disminuir el dolor y los espasmos musculares. Alterne entre el calor y el hielo.      •Aplique hieloen la espalda de 15 a 20 minutos cada hora o jenny se le indique. Use juliana compresa de hielo o ponga hielo triturado en juliana bolsa de plástico. Cúbralo con juliana toalla antes de aplicarlo sobre corona piel. El hielo ayuda a evitar daño al tejido y a disminuir la inflamación y el dolor.      Prevenir el dolor antonieta de la parte inferior de la espalda:  •Use la mecánica corporal adecuada.?Flexione la cadera y las rodillas cuando vaya a levantar un objeto. No doble la cintura. Utilice los músculos de las piernas mientras levanta corona carga. No use corona espalda. Mantenga el objeto cerca de corona pecho mientras lo levanta. No se tuerza, ni levante cualquier cosa por encima de corona cintura.      ?Cambie corona posición frecuentemente cuando pase mucho tiempo de pie. Descanse un pie sobre juliana caja pequeña o un reposapiés e intercambie con el otro pie frecuentemente.      ?No permanezca sentado por lapsos de tiempo prolongados. Cuando sea necesario hacerlo, siéntese en juliana silla de respaldo recto con los pies apoyados en el suelo. Nunca alcance, jale ni empuje mientras se encuentra sentando.      •Preston ejercicios que fortalezcan elizabeth músculos de la espalda.Entre en calor antes de hacer ejercicio. Consulte con corona médico sobre el mejor plan de ejercicios para usted.      •Mantenga un peso saludable.Pregúntele a corona médico cuál es el peso ideal para usted. Pídale que lo ayude a crear un plan para bajar de peso si tiene sobrepeso.      Acuda a la consulta de control con corona médico según las indicaciones:Regrese a juliana joanna de seguimiento si usted aún tiene dolor después de 1 a 3 semanas de tratamiento. Brenden vez necesite acudir con un ortopedista si corona dolor de espalda dura más de 12 semanas. Anote elizabeth preguntas para que se acuerde de hacerlas eleno elizabeth visitas.                  MAGNETIC RESONANCE IMAGING - General Information           Imagen por resonancia magnética    LO QUE NECESITA SABER:    ¿Qué necesito saber acerca de juliana imagen por resonancia magnética (IRM)?Juliana IRM es un examen que utiliza artis magnéticos y ondas de radio para xi imágenes del interior de corona cuerpo. Juliana IRM se utiliza para alecia los vasos sanguíneos, el tejido, los músculos y los huesos. También puede mostrar órganos, jenny corona corazón, pulmones o hígado. Juliana IRM puede ayudar a que corona médico diagnostique o trate juliana condición médica. No utiliza radiación.    ¿Cómo me preparo para juliana IRM?  •Corona médico le indicará los medicamentos que puede xi o no el día de la IRM. Le podría administrar medicamento para ayudarlo a sentirse calmado y relajado eleno la IRM.      •Informe a corona médico si usted sabe o piensa que está embarazada.      •Dígale a corona médico si usted tiene algún metal dentro de corona cuerpo, jenny un marcapasos, implante o pinza de aneurisma. Dígale si usted tiene un tatuaje o usa un parche medicinal.      •Quítese cualquier artículo de metal que lleve puesto, jenny broches para el sana, joyería, anteojos, audífonos o dientes postizos antes de entrar a la habitación de IRM.      ¿Qué sucederá eleno juliana IRM?Corona médico le pedirá que se acueste sobre juliana mayito. Podría usarse líquido de contraste para ayudar a que juliana parte de corona cuerpo se tyrese más claramente. La mayito será deslizada hacia un espacio abierto en el centro de la máquina. Usted tendrá que permanecer quieto eleno la IRM. Es normal escuchar ruidos de golpes, estruendos o de clics en la máquina.              ¿Cuáles son los riesgos de juliana IRM?Juliana IRM podría provocar que un objeto de metal en corona cuerpo se salga de corona lugar y provoque lesiones serias o que deje de funcionar apropiadamente. El líquido de contraste podría provocar náusea, dolor de lurdes, desvanecimiento o dolor en el área de la inyección. Usted también podría sufrir juliana reacción alérgica al líquido de contraste.    ACUERDOS SOBRE CORONA CUIDADO:    Usted tiene el derecho de ayudar a planear corona cuidado. Aprenda todo lo que pueda sobre corona condición y jenny darle tratamiento. Discuta elizabeth opciones de tratamiento con elizabeth médicos para decidir el cuidado que usted desea recibir. Usted siempre tiene el derecho de rechazar el tratamiento.

## 2021-04-12 NOTE — ED STATDOCS - MUSCULOSKELETAL, MLM
range of motion is not limited +pain to lower back with palpation. Pain in lower back with leg extension. Pain does not radiate to BLE. Motor 5/5

## 2021-04-12 NOTE — ED STATDOCS - CARE PLAN
Principal Discharge DX:	Low back pain   Principal Discharge DX:	Low back pain  Secondary Diagnosis:	Herniated intervertebral disc of lumbar spine

## 2021-04-12 NOTE — ED STATDOCS - PHYSICAL EXAMINATION
PA NOTE: GEN: AOX3, NAD. HEENT: Throat clear. Airway is patent. EYES: PERRLA. EOMI. Head: NC/AT. NECK: Supple, No JVD. FROM. C-spine non-tender. CV:S1S2, RRR, LUNGS: Non-labored breathing, no tachypnea. O2sat 100% RA. CTA b/l. No w/r/r. CHEST: Equal chest expansion and rise. No deformity. ABD: Soft, NT/ND, no rebound, no guarding. No CVAT. BACK: +Muscular spasm noted lumbar paravertebral area. +MSK tenderness. Painful flexion/extension. No bony deformity. NVI. 2+ reflexes. EXT: No e/c/c. 2+ distal pulses. SKIN: No rashes. NEURO: No focal deficits. CN II-XII intact. FROM. 5/5 motor and sensory. ~Dheeraj Holly PA-C

## 2021-04-12 NOTE — ED STATDOCS - NSFOLLOWUPCLINICS_GEN_ALL_ED_FT
[facility]  Family Medicine  [address] [place] [address]  Phone: [phone]  Fax:

## 2021-04-12 NOTE — ED STATDOCS - CLINICAL SUMMARY MEDICAL DECISION MAKING FREE TEXT BOX
CT scan and pain control. Pt with most likely disc herniation, f/u with physician at [facility]. CT scan and pain control. Pt with most likely disc herniation, f/u with physician at [facility].    PA note: All studies reviewed in details with patient. Patient re-examined and re-evaluated. Patient feels much better at this time. ED evaluation, Diagnosis and management discussed with the patient in detail. Workup results discussed with ED attending, OK to dc home. MRI & close ORTHO SPINE Dr follow up encouraged.  Strict ED return instructions discussed in detail and patient given the opportunity to ask any questions about their discharge diagnosis and instructions. Patient verbalized understanding. ~ Dheeraj Holly PA-C

## 2021-04-12 NOTE — ED STATDOCS - CARE PROVIDER_API CALL
Tj Ayala)  Orthopaedic Surgery  [address] [place] [address]  Phone: [phone]  Fax: [phone]  Follow Up Time: Routine

## 2021-04-12 NOTE — ED ADULT NURSE NOTE - OBJECTIVE STATEMENT
Patient presents to the ED c/o back pain. Pt states he fell on 12/2020 on ice, injured back with no LOC or head strike. Pt states that pain back in 12/2020 improved. Pt reports sudden onset episode of back pain since yesterday. Denies any injuries, trauma, or fall prior to yesterday's back pain. Denies bladder or bowel incontinence, fevers, chills, numbness, tingling, or weakness to BLE.

## 2021-11-14 ENCOUNTER — EMERGENCY (EMERGENCY)
Facility: HOSPITAL | Age: 46
LOS: 0 days | Discharge: HOME | End: 2021-11-14
Attending: FAMILY MEDICINE
Payer: MEDICAID

## 2021-11-14 VITALS
OXYGEN SATURATION: 98 % | RESPIRATION RATE: 17 BRPM | TEMPERATURE: 98 F | HEART RATE: 72 BPM | SYSTOLIC BLOOD PRESSURE: 110 MMHG | DIASTOLIC BLOOD PRESSURE: 82 MMHG

## 2021-11-14 VITALS — WEIGHT: 169.98 LBS | HEIGHT: 65 IN

## 2021-11-14 DIAGNOSIS — K59.00 CONSTIPATION, UNSPECIFIED: ICD-10-CM

## 2021-11-14 DIAGNOSIS — I10 ESSENTIAL (PRIMARY) HYPERTENSION: ICD-10-CM

## 2021-11-14 DIAGNOSIS — R14.0 ABDOMINAL DISTENSION (GASEOUS): ICD-10-CM

## 2021-11-14 DIAGNOSIS — K57.92 DIVERTICULITIS OF INTESTINE, PART UNSPECIFIED, WITHOUT PERFORATION OR ABSCESS WITHOUT BLEEDING: ICD-10-CM

## 2021-11-14 LAB
ALBUMIN SERPL ELPH-MCNC: 4 G/DL — SIGNIFICANT CHANGE UP (ref 3.3–5)
ALP SERPL-CCNC: 87 U/L — SIGNIFICANT CHANGE UP (ref 40–120)
ALT FLD-CCNC: 42 U/L — SIGNIFICANT CHANGE UP (ref 12–78)
ANION GAP SERPL CALC-SCNC: 6 MMOL/L — SIGNIFICANT CHANGE UP (ref 5–17)
APPEARANCE UR: CLEAR — SIGNIFICANT CHANGE UP
AST SERPL-CCNC: 16 U/L — SIGNIFICANT CHANGE UP (ref 15–37)
BASOPHILS # BLD AUTO: 0.03 K/UL — SIGNIFICANT CHANGE UP (ref 0–0.2)
BASOPHILS NFR BLD AUTO: 0.3 % — SIGNIFICANT CHANGE UP (ref 0–2)
BILIRUB SERPL-MCNC: 0.5 MG/DL — SIGNIFICANT CHANGE UP (ref 0.2–1.2)
BILIRUB UR-MCNC: NEGATIVE — SIGNIFICANT CHANGE UP
BUN SERPL-MCNC: 11 MG/DL — SIGNIFICANT CHANGE UP (ref 7–23)
CALCIUM SERPL-MCNC: 8.8 MG/DL — SIGNIFICANT CHANGE UP (ref 8.5–10.1)
CHLORIDE SERPL-SCNC: 103 MMOL/L — SIGNIFICANT CHANGE UP (ref 96–108)
CO2 SERPL-SCNC: 29 MMOL/L — SIGNIFICANT CHANGE UP (ref 22–31)
COLOR SPEC: YELLOW — SIGNIFICANT CHANGE UP
CREAT SERPL-MCNC: 0.89 MG/DL — SIGNIFICANT CHANGE UP (ref 0.5–1.3)
DIFF PNL FLD: ABNORMAL
EOSINOPHIL # BLD AUTO: 0.11 K/UL — SIGNIFICANT CHANGE UP (ref 0–0.5)
EOSINOPHIL NFR BLD AUTO: 1 % — SIGNIFICANT CHANGE UP (ref 0–6)
GLUCOSE SERPL-MCNC: 92 MG/DL — SIGNIFICANT CHANGE UP (ref 70–99)
GLUCOSE UR QL: NEGATIVE MG/DL — SIGNIFICANT CHANGE UP
HCT VFR BLD CALC: 45.3 % — SIGNIFICANT CHANGE UP (ref 39–50)
HGB BLD-MCNC: 15.3 G/DL — SIGNIFICANT CHANGE UP (ref 13–17)
IMM GRANULOCYTES NFR BLD AUTO: 0.2 % — SIGNIFICANT CHANGE UP (ref 0–1.5)
KETONES UR-MCNC: NEGATIVE — SIGNIFICANT CHANGE UP
LACTATE SERPL-SCNC: 0.7 MMOL/L — SIGNIFICANT CHANGE UP (ref 0.7–2)
LEUKOCYTE ESTERASE UR-ACNC: NEGATIVE — SIGNIFICANT CHANGE UP
LIDOCAIN IGE QN: 72 U/L — LOW (ref 73–393)
LYMPHOCYTES # BLD AUTO: 1.57 K/UL — SIGNIFICANT CHANGE UP (ref 1–3.3)
LYMPHOCYTES # BLD AUTO: 14.3 % — SIGNIFICANT CHANGE UP (ref 13–44)
MCHC RBC-ENTMCNC: 29.4 PG — SIGNIFICANT CHANGE UP (ref 27–34)
MCHC RBC-ENTMCNC: 33.8 GM/DL — SIGNIFICANT CHANGE UP (ref 32–36)
MCV RBC AUTO: 87.1 FL — SIGNIFICANT CHANGE UP (ref 80–100)
MONOCYTES # BLD AUTO: 1.14 K/UL — HIGH (ref 0–0.9)
MONOCYTES NFR BLD AUTO: 10.4 % — SIGNIFICANT CHANGE UP (ref 2–14)
NEUTROPHILS # BLD AUTO: 8.1 K/UL — HIGH (ref 1.8–7.4)
NEUTROPHILS NFR BLD AUTO: 73.8 % — SIGNIFICANT CHANGE UP (ref 43–77)
NITRITE UR-MCNC: NEGATIVE — SIGNIFICANT CHANGE UP
PH UR: 6.5 — SIGNIFICANT CHANGE UP (ref 5–8)
PLATELET # BLD AUTO: 237 K/UL — SIGNIFICANT CHANGE UP (ref 150–400)
POTASSIUM SERPL-MCNC: 3.9 MMOL/L — SIGNIFICANT CHANGE UP (ref 3.5–5.3)
POTASSIUM SERPL-SCNC: 3.9 MMOL/L — SIGNIFICANT CHANGE UP (ref 3.5–5.3)
PROT SERPL-MCNC: 7.8 GM/DL — SIGNIFICANT CHANGE UP (ref 6–8.3)
PROT UR-MCNC: NEGATIVE MG/DL — SIGNIFICANT CHANGE UP
RBC # BLD: 5.2 M/UL — SIGNIFICANT CHANGE UP (ref 4.2–5.8)
RBC # FLD: 12.8 % — SIGNIFICANT CHANGE UP (ref 10.3–14.5)
SODIUM SERPL-SCNC: 138 MMOL/L — SIGNIFICANT CHANGE UP (ref 135–145)
SP GR SPEC: 1.01 — SIGNIFICANT CHANGE UP (ref 1.01–1.02)
UROBILINOGEN FLD QL: NEGATIVE MG/DL — SIGNIFICANT CHANGE UP
WBC # BLD: 10.97 K/UL — HIGH (ref 3.8–10.5)
WBC # FLD AUTO: 10.97 K/UL — HIGH (ref 3.8–10.5)

## 2021-11-14 PROCEDURE — 83605 ASSAY OF LACTIC ACID: CPT

## 2021-11-14 PROCEDURE — 87086 URINE CULTURE/COLONY COUNT: CPT

## 2021-11-14 PROCEDURE — 99285 EMERGENCY DEPT VISIT HI MDM: CPT

## 2021-11-14 PROCEDURE — 74177 CT ABD & PELVIS W/CONTRAST: CPT | Mod: 26,ME

## 2021-11-14 PROCEDURE — 87040 BLOOD CULTURE FOR BACTERIA: CPT

## 2021-11-14 PROCEDURE — 99284 EMERGENCY DEPT VISIT MOD MDM: CPT | Mod: 25

## 2021-11-14 PROCEDURE — 36415 COLL VENOUS BLD VENIPUNCTURE: CPT

## 2021-11-14 PROCEDURE — 86901 BLOOD TYPING SEROLOGIC RH(D): CPT

## 2021-11-14 PROCEDURE — 86900 BLOOD TYPING SEROLOGIC ABO: CPT

## 2021-11-14 PROCEDURE — 80053 COMPREHEN METABOLIC PANEL: CPT

## 2021-11-14 PROCEDURE — 86850 RBC ANTIBODY SCREEN: CPT

## 2021-11-14 PROCEDURE — G1004: CPT

## 2021-11-14 PROCEDURE — 74177 CT ABD & PELVIS W/CONTRAST: CPT | Mod: ME

## 2021-11-14 PROCEDURE — 85025 COMPLETE CBC W/AUTO DIFF WBC: CPT

## 2021-11-14 PROCEDURE — 81001 URINALYSIS AUTO W/SCOPE: CPT

## 2021-11-14 PROCEDURE — 83690 ASSAY OF LIPASE: CPT

## 2021-11-14 RX ORDER — SODIUM CHLORIDE 9 MG/ML
1000 INJECTION INTRAMUSCULAR; INTRAVENOUS; SUBCUTANEOUS ONCE
Refills: 0 | Status: COMPLETED | OUTPATIENT
Start: 2021-11-14 | End: 2021-11-14

## 2021-11-14 RX ADMIN — SODIUM CHLORIDE 1000 MILLILITER(S): 9 INJECTION INTRAMUSCULAR; INTRAVENOUS; SUBCUTANEOUS at 18:03

## 2021-11-14 RX ADMIN — Medication 1 TABLET(S): at 20:09

## 2021-11-14 NOTE — ED STATDOCS - OBJECTIVE STATEMENT
46 y/o male with hx of irritable colon presents to ED c/o intermittent abdominal pain for past 4-5 days. Pt states he had shrimp last time 3 times and also ate the tails, unsure if eating shrimp exacerbates abdominal pain. Pt also with chills, states abdominal pain feels like cramping, worse when sitting, states he feels "blocked up." Also with difficulty having BM, last BM 4 days ago. States abdominal pain worsening, feels like things are moving. Pain does not radiate to back. Denies difficulty urinating, N/V/D. NKDA. Only takes vitamin D, no other daily medications. Nonsmoker. No illicit drug use.

## 2021-11-14 NOTE — ED ADULT TRIAGE NOTE - PAIN RATING/NUMBER SCALE (0-10): ACTIVITY
Verbal consent obtained via this phone encounter for cystoscopy, bladder wall injection with botox in the OR today with Dr. Lawton.  Mireya RAMESH witnessed consent.  See consent tab for documentation.     4

## 2021-11-14 NOTE — ED STATDOCS - PROGRESS NOTE DETAILS
46 y/o M with PMH of IBS presents 4-5 days of lower abdominal pain. Pt states he accidentally consumed shrimp tails and started having symptoms shortly afterwards. States he has not had a bowel movement in 4 days. +chills. Denies fever, nausea, vomiting, history of abdominal surgery. PE: well appearing. Cardiac: s1s2, RRR. lungs: CTAB. Abdomen: NBS x4, guarding and tenderness in LLQ. No CVAT. A/P; r/o diverticulitis. plan for labs, IVF, CT, pt refusing analgesia at this time. - Ángel Henderson PA-C +diverticulitis. Results reviewed with patient with  ID# [id number]. Offered TID augmentin vs. combination cipro/flagyl. Patient prefers augmentin, will dc home. Advised PCP follow up. - Ángel Henderson PA-C

## 2021-11-14 NOTE — ED STATDOCS - NSFOLLOWUPINSTRUCTIONS_ED_ALL_ED_FT
Diverticulitis    LO QUE NECESITA SABER:    ¿Qué es diverticulitis?La diverticulitis es juliana condición que provoca que bolsas pequeñas a lo asmita de joelle intestinos que se conocen jenny divertículos, se inflamen o se infecten. [place] es provocado por evacuaciones intestinales duras, alimentos o bacteria que se quedan atorados en las bolsas.    Divertículos         ¿Cuáles son los signos y síntomas de la diverticulitis?  •Dolor en la parte inferior izquierda de grover abdomen      •Fiebre y escalofríos      •Náuseas o vómitos      •Estreñimiento o diarrea      •Un impulso de orinar o realizar juliana evacuación intestinal más frecuente de lo usual      •Evacuaciones intestinales sangrientas      •Distención abdominal y flatulencia      ¿Cómo se diagnostica la diverticulitis?Grover médico lo examinará. Le hará preguntas acerca de joelle síntomas e historial médico. Es posible que usted necesite alguno de los siguientes:   •Exámenes de rasheed y orinapodrían mostrar signos de infección o inflamación.      •Tomografía computarizada o ecografíaque podrían usarse para encontrar problemas en joelle intestinos. Es probable que le administren un líquido de contraste para que joelle intestinos se muestren mejor en las imágenes. Dígale al médico si usted alguna vez ha tenido juliana reacción alérgica al líquido de contraste.      •Juliana colonoscopiase usa para observar joelle intestinos con juliana sonda. Juliana sonda (tubo asmita y flexible con juliana jaron en grover extremidad) se usa para xi imágenes. Ashley examen podría mostrar divertículos inflamados o sangrado. Se extraerán muestras del interior de grover tracto digestivo para enviarlos al laboratorio para ser analizadas. El sangrado podría controlarse con instrumentos que se insertan por medio de la sonda.             ¿Cómo se trata la diverticulitis?La diverticulitis leve puede ser tratada en el hogar. Usted puede necesitar descansar y seguir juliana dieta líquida hasta que mejore grover diverticulitis. Usted será hospitalizado si tiene diverticulitis severa. Es posible que usted necesite alguno de los siguientes:   •Los antibióticosayudan a tratar o prevenir infecciones bacterianas.      •Puede administrarsepodrían administrarse. Pregunte al médico cómo debe xi ashley medicamento de forma simmons. Algunos medicamentos recetados para el dolor contienen acetaminofén. No tome otros medicamentos que contengan acetaminofén sin consultarlo con grover médico. Demasiado acetaminofeno puede causar daño al hígado. Los medicamentos recetados para el dolor podrían causar estreñimiento. Pregunte a grover médico jenny prevenir o tratar estreñimiento.      •Juliana vía IVse puede utilizar para administrarle a usted líquidos y alimentación. Es probable que usted no pueda comer o beber nada hasta que grover médico lo autorice.      •Drenajepodría hacerse para reducir la inflamación o tratar la infección. Grover médico le podría insertar juliana sonda o tubo a través de juliana incisión en grover abdomen para drenar la pus del divertículo infectado.      •La cirugíapodría necesitarse si el intestino está perforado o si hay mucha inflamación. Un médico le extirparía las áreas infectadas o inflamadas de grover colon.      ¿Cómo puedo controlar los síntomas?Juliana dieta de líquidos roni permitirá que joelle intestinos sanen. Ashley tipo de dieta incluye líquidos transparentes. Por ejemplo, agua, soda de jengibre, jugo de arándanos o manzana, jugo de frutas congelado o consomé. Pídale a grover médico más información sobre la dieta de líquidos roni.    ¿Cuándo jamila buscar atención inmediata?  •Usted presenta evacuación intestinal o flujo vaginal con un aroma desagradable que gotea de grover vagina o se encuentra en grover orina.      •Usted tiene diarrea severa.      •Usted orina menos de lo normal o no orina nada en absoluto.      •Usted es incapaz de realizar juliana evacuación intestinal.      •Usted no puede dejar de vomitar.      •Usted tiene dolor abdominal severo, fiebre y grover abdomen está más gagandeep de lo usual.      •Usted nota rasheed por primera vez o un aumento de rasheed en joelle evacuaciones intestinales.      ¿Cuándo jamila llamar a mi médico?  •Siente dolor al orinar.      •Joelle síntomas empeoran o no desaparecen.      •Usted tiene preguntas o inquietudes acerca de grover condición o cuidado.      ACUERDOS SOBRE GROVER CUIDADO:    Usted tiene el derecho de ayudar a planear grover cuidado. Aprenda todo lo que pueda sobre grover condición y jenny darle tratamiento. Discuta joelle opciones de tratamiento con joelle médicos para decidir el cuidado que usted desea recibir. Usted siempre tiene el derecho de rechazar el tratamiento.

## 2021-11-14 NOTE — ED STATDOCS - PATIENT PORTAL LINK FT
You can access the FollowMyHealth Patient Portal offered by [facility] by registering at the following website: [email or web address]. By joining [email or web address]’s FollowMyHealth portal, you will also be able to view your health information using other applications (apps) compatible with our system.

## 2021-11-14 NOTE — ED ADULT TRIAGE NOTE - CHIEF COMPLAINT QUOTE
Pt c/o abd pain x4 days. pain started after eating seafood. pt states he has been trying to have bowel movements but very little comes out. Denies nausea or vomiting

## 2021-11-14 NOTE — ED STATDOCS - CLINICAL SUMMARY MEDICAL DECISION MAKING FREE TEXT BOX
Pt with hx irritable colon here with constipation and LLQ tenderness, r/o diverticulitis, labs, cat scan, ivf

## 2021-11-16 LAB
CULTURE RESULTS: SIGNIFICANT CHANGE UP
SPECIMEN SOURCE: SIGNIFICANT CHANGE UP

## 2021-11-20 ENCOUNTER — EMERGENCY (EMERGENCY)
Facility: HOSPITAL | Age: 46
LOS: 0 days | Discharge: HOME | End: 2021-11-20
Attending: EMERGENCY MEDICINE
Payer: MEDICAID

## 2021-11-20 VITALS — HEIGHT: 65 IN | WEIGHT: 175.05 LBS

## 2021-11-20 VITALS
HEART RATE: 64 BPM | SYSTOLIC BLOOD PRESSURE: 117 MMHG | TEMPERATURE: 99 F | DIASTOLIC BLOOD PRESSURE: 75 MMHG | OXYGEN SATURATION: 100 % | RESPIRATION RATE: 19 BRPM

## 2021-11-20 DIAGNOSIS — R21 RASH AND OTHER NONSPECIFIC SKIN ERUPTION: ICD-10-CM

## 2021-11-20 DIAGNOSIS — T78.40XA ALLERGY, UNSPECIFIED, INITIAL ENCOUNTER: ICD-10-CM
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
## 2021-11-20 DIAGNOSIS — L29.9 PRURITUS, UNSPECIFIED: ICD-10-CM

## 2021-11-20 LAB
CULTURE RESULTS: SIGNIFICANT CHANGE UP
CULTURE RESULTS: SIGNIFICANT CHANGE UP
SPECIMEN SOURCE: SIGNIFICANT CHANGE UP
SPECIMEN SOURCE: SIGNIFICANT CHANGE UP

## 2021-11-20 PROCEDURE — 99283 EMERGENCY DEPT VISIT LOW MDM: CPT

## 2021-11-20 PROCEDURE — 99284 EMERGENCY DEPT VISIT MOD MDM: CPT

## 2021-11-20 RX ORDER — FAMOTIDINE 10 MG/ML
1 INJECTION INTRAVENOUS
Qty: 4 | Refills: 0
Start: 2021-11-20 | End: 2021-11-23

## 2021-11-20 RX ORDER — DIPHENHYDRAMINE HCL 50 MG
25 CAPSULE ORAL ONCE
Refills: 0 | Status: COMPLETED | OUTPATIENT
Start: 2021-11-20 | End: 2021-11-20

## 2021-11-20 RX ORDER — DIPHENHYDRAMINE HCL 50 MG
1 CAPSULE ORAL
Qty: 16 | Refills: 0
Start: 2021-11-20 | End: 2021-11-23

## 2021-11-20 RX ORDER — FAMOTIDINE 10 MG/ML
20 INJECTION INTRAVENOUS ONCE
Refills: 0 | Status: COMPLETED | OUTPATIENT
Start: 2021-11-20 | End: 2021-11-20

## 2021-11-20 RX ADMIN — FAMOTIDINE 20 MILLIGRAM(S): 10 INJECTION INTRAVENOUS at 19:57

## 2021-11-20 RX ADMIN — Medication 25 MILLIGRAM(S): at 19:57

## 2021-11-20 RX ADMIN — Medication 50 MILLIGRAM(S): at 19:57

## 2021-11-20 NOTE — ED ADULT NURSE NOTE - OBJECTIVE STATEMENT
Pt presents to er with complaints of allergic reaction to Amoxcillin, states he was on it for intestinal infection, pt denies chest pain, sob, fevers at this time.

## 2021-11-20 NOTE — ED ADULT NURSE NOTE - NSIMPLEMENTINTERV_GEN_ALL_ED
Implemented All Universal Safety Interventions:  [place] to call system. Call bell, personal items and telephone within reach. Instruct patient to call for assistance. Room bathroom lighting operational. Non-slip footwear when patient is off stretcher. Physically safe environment: no spills, clutter or unnecessary equipment. Stretcher in lowest position, wheels locked, appropriate side rails in place.

## 2021-11-20 NOTE — ED STATDOCS - NSFOLLOWUPINSTRUCTIONS_ED_ALL_ED_FT
Alergia a un medicamento antibiótico    LO QUE NECESITA SABER:    ¿Qué es juliana alergia a un medicamento antibiótico?Juliana alergia a un medicamento antibiótico es juliana reacción dañina a un antibiótico. La reacción puede empezar poco tiempo después de xi el medicamento o días o semanas después de dejar de tomarlo. Los médicos no pueden saber con anticipación si usted va a sufrir juliana reacción alérgica. Grover sistema inmune podría volverse sensible al antibiótico la primera vez que lo anjelica. Puede que usted sufra juliana reacción alérgica la próxima vez. Los antibióticos que tienen más probabilidades de causar juliana reacción alérgica son la penicilina y la cefalosporina.    ¿Cuáles son los signos y síntomas de juliana reacción alérgica a un antibiótico?  •Los síntomas levesincluyen enrojecimiento, picazón, escamación, o inflamación de la piel. Usted puede tener un área de la piel plana y enrojecida que está cubierta de bultos pequeños. También es probable que tenga urticaria.      •Síntomas severosincluyen piel que forma ampollas o se descama, problemas de la vista e inflamación severa o comezón. Las reacciones graves incluyen condiciones jenny la de necrólisis epidérmica tóxica. Pídale a grover médico más información sobre la necrólisis y otros trastornos graves.      •Los síntomas de la anafilaxiaincluyen opresión en la garganta, dificultad para respirar, hormigueo, mareos y sibilancias. La anafilaxia es juliana reacción repentina y de peligro mortal que requiere de tratamiento inmediato. La anafilaxia podría ocurrir si usted se ejercita después de amanda estado expuesto a un factor desencadenante, jenny después de xi un antibiótico.      ¿Qué aumenta mi riesgo de juliana alergia a un medicamento antibiótico?  •Otras alergias, jenny a los gatos      •Un historial familiar de alergias a antibióticos      •Uso frecuente de antibióticos      •Juliana enfermedad de asmita plazo que hace que grover sistema inmune sea más sensible      ¿Cómo se diagnostica juliana alergia a un medicamento antibiótico?Grover médico le preguntará sobre grover historial médico y alergias. Es probable que usted también necesite realizarse exámenes adicionales si desarrolló anafilaxia después de amanda estado expuesto a un factor desencadenante y luego hizo ejercicio. [place] se conoce jenny anafilaxia inducida por el ejercicio. Es posible que también necesite alguno de los siguientes tratamientos:   •Análisis de rasheed:Puede que usted necesite nick muestras de rasheed para que los médicos obtengan información de cómo está funcionando grover cuerpo. La rasheed podría extraerse de la mano, del brazo o por medio de juliana vía IV.      •Juliana prueba del parchequiere decir que le aplicarán juliana cantidad pequeña del antibiótico en la piel. El área la cubren con un parche que se liane por 2 días. Luego el médico examinará grover piel por signos de juliana reacción.      •Juliana prueba de punción cutáneaquiere decir que le colocan juliana pequeña gota del antibiótico en grover antebrazo y luego le pinchan la piel con juliana aguja. Grover médico observará si presenta juliana reacción.      •Un examen intradérmicoquiere decir que le inyectarán juliana pequeña cantidad de líquido antibiótico por debajo de la superficie de grover piel. Grover médico observará si presenta juliana reacción.      •Un examen de provocación al fármacotambién se conoce jenny prueba de provocación. Grover médico le administra dosis cada vez más elevadas del medicamento antibiótico y lo observa para detectar cualquier reacción.      ¿Cómo se trata juliana reacción alérgica a un antibiótico?  •Los antihistamínicosreducen los síntomas moderados jenny la comezón o un sarpullido.      •La epinefrinaes un medicamento usado para tratar reacciones alérgicas graves jenny la anafilaxia.      •Esteroidesreducen la inflamación.      •La insensibilizaciónpuede hacerse después de que haya juliana reacción si usted necesita ser tratado con medicamentos antibióticos de nuevo. Grover médico le dará dosis pequeñas del antibiótico a lo asmita de varias horas. También tratará cualquier reacción alérgica que usted tenga. Se incrementará grover dosis poco a poco hasta que se llegue a la dosis completa y el medicamento deje de causarle juliana reacción alérgica. Usted tendrá que tomarse juliana dosis del antibiótico a diario para mantener a grover cuerpo insensibilizado.      ¿Qué pasos necesito seguir cuando hay señales o síntomas de anafilaxis?  •Inmediatamenteaplíquese 1 inyección de epinefrina fred hágalo solamente en el músculo del muslo que da hacia afuera.      •Deje la inyección en el lugarsegún las indicaciones. Es probable que grover médico le recomiende que se la deje puesta por hasta 10 segundos antes de quitarla. [place] ayuda a asegurarse de que toda la epinefrina sea aplicada.      •Llame al 911 y vaya al servicio de urgencias,aunque la inyección haya alba elizabeth síntomas. No maneje usted mismo. Lleve con usted la inyección de epinefrina que usó.      ¿Que precauciones de seguridad necesito seguir si estoy en riesgo de presentar anafilaxia?  •Tenga a mano 2 inyecciones de epinefrina en todo momento.Puede que usted necesite juliana segunda inyección ya que la epinefrina solamente actúa por aproximadamente 20 minutos y los síntomas podrían regresar. Grover médico puede mostrarle a usted y a elizabeth familiares cómo aplicar la inyección. Revise la fecha de vencimiento todos los meses y reemplace el medicamento de grover vencimiento.      •Elabore un plan de acción.Grover médico puede ayudarle a crear un plan escrito que explique la alergia y un plan de emergencia para tratar juliana reacción. El plan explica cuándo aplicar juliana segunda inyección de epinefrina si los síntomas vuelven o no mejoran después de la primera inyección. Asegúrese de que elizabeth familiares, personal de grover trabajo y escuela tengan juliana copia del plan de acción e instrucciones de emergencia. Muéstreles cómo aplicar la inyección de epinefrina.      •Tenga cuidado cuando leila ejercicio.Si usted tuvo anafilaxis inducida por el ejercicio, no se ejercite inmediatamente después de comer. Pare de ejercitarse de inmediato si empieza a desarrollar cualquier signo o síntoma de anafilaxia. Puede que al principio se sienta cansado, caliente o tenga comezón en la piel. También podría desarrollar urticaria, inflamación y problemas graves de respiración si continúa ejercitándose.      •Lleve juliana identificación de alerta médica.Use identificación de alerta médica, jenny un brazalete o collar, o tenga a mano juliana tarjeta que diga que usted es alérgico a un medicamento antibiótico. Los médicos necesitan saber que no deben tratarlo con ashley antibiótico. Pregúntele a grover médico dónde conseguir estos artículos.  Accesorios de alerta médica           •Marylu las etiquetas de los medicamentos antes de xi cualquier medicamento.No tome el medicamento si contiene antibiótico al cual usted es alérgico. [place] incluye los medicamentos tópicos que se aplican sobre la piel. Consulte con un farmacéutico si usted no está seguro.      •Dígale a todos los médicos sobre grover alergia.Siempre infórmele a elizabeth médicos los nombres de los medicamentos a los que usted es alérgico y los síntomas de elizabeth reacciones alérgicas.      •Pregunte si usted necesita evitar otros medicamentos.Es probable que usted sea alérgico a otros medicamentos si ha tenido juliana reacción alérgica a un antibiótico. Asegúrese de saber los nombres de los otros medicamentos que usted no debe xi.      Llame al 911 en [place] de presentar signos o síntomas de anafilaxia,jenny dificultad para respirar, inflamación en grover boca o garganta, o sibilancias. También es posible que tenga comezón, sarpullido, urticaria o sienta que se va a desmayar.    ¿Cuándo jamila buscar atención inmediata?  •Usted tiene un sarpullido con comezón, inflamación y puntos rojos.      •Usted tiene ampollas o grover piel se está descamando.      •Usted tiene dificultad para tragar o grover voz suena ronca.      •Grover corazón está latiendo rápido o lacie.      •Grover piel o las partes lupe de elizabeth ojos se vuelven amarillentas.      ¿Cuándo jamila comunicarme con mi médico?  •Usted jason estar tendiendo juliana reacción alérgica. Comuníquese con grover médico antes de xi la siguiente dosis de grover antibiótico.      •Usted tiene sarpullido.      •Tiene fiebre.      •Usted tiene dolor de garganta o glándulas inflamadas. Sentirá bultos duros cuanto se toque la garganta si tiene las glándulas inflamadas.      •Usted tiene picazón y grover piel se torna franco cuando está expuesto a la jaron solar.      •Tiene preguntas o inquietudes sobre grover condición, alergia o cuidado.      ACUERDOS SOBRE GROVER CUIDADO:    Usted tiene el derecho de ayudar a planear grover cuidado. Aprenda todo lo que pueda sobre grover condición y jenny darle tratamiento. Discuta elizabeth opciones de tratamiento con elizabeth médicos para decidir el cuidado que usted desea recibir. Usted siempre tiene el derecho de rechazar el tratamiento.

## 2021-11-20 NOTE — ED STATDOCS - CLINICAL SUMMARY MEDICAL DECISION MAKING FREE TEXT BOX
Given meds for allergic rash.  Stopping Augmentin.  Will hold on restarting antibiotics as patient asymptomatic from prior infection, and now having normal bowel movements.

## 2021-11-20 NOTE — ED STATDOCS - CPE ED [facility]
MEDICARE WELLNESS VISIT NOTE      History of Present Illness:   Nehal Tam presents for her Welcome to Medicare Medicare Wellness Visit.   She has complaints or concerns which include:    Patient admits to checking her blood pressure at home and averages 130-135 systolic and 65-70 diastolic.     Patient denies fever, chills, chest pain, shortness of breath, cough, congestion, abdominal pain, vaginal concerns, constipation, diarrhea, urinary incontinence, and blood in stool.     Patient does water aerobics 3 times a week and walks for exercise.      Patient Care Team:  Windy Carpenter MD as PCP - General (Family Practice)  Ascencion Harper MD as Gastroenterologist (Gastroenterology)        There are no active problems to display for this patient.        Past Medical History:   Diagnosis Date   • Colon polyp 03/29/2018    hyperplastic. Repeat colonoscopy in 5 years secondary to polyp and family history. Dr. Harper.   • Eczema    • Essential (primary) hypertension    • Rosacea    • Varicella          Past Surgical History:   Procedure Laterality Date   • Back surgery     • Colonoscopy  03/08/2011    normal   • Colonoscopy w/ polypectomy  03/28/2018    Dr. Harper. Repeat in 5 years secondary to history of polyps and family history of colon cancer.   • Lumbar laminectomy     • Removal of tonsils,<9 y/o     • Remove benign thyroid lesion     • Tubal ligation           Social History     Tobacco Use   • Smoking status: Former Smoker     Packs/day: 5.00   • Smokeless tobacco: Never Used   Substance Use Topics   • Alcohol use: No   • Drug use: No     Drug use:    Drug Use:    No              Family History - Reviewed    Current Outpatient Medications   Medication Sig Dispense Refill   • hydrochlorothiazide (HYDRODIURIL) 25 MG tablet TAKE 1 TABLET BY MOUTH  DAILY 90 tablet 3   • Cholecalciferol (VITAMIN D3) 5000 units Tab Take 5,000 Int'l Units by mouth three times a week.       No current facility-administered  medications for this visit.         The following items on the Medicare Health Risk Assessment were found to be positive  6 c.) How many servings of Fried or High Fat Foods do you have each day (1 serving = 1 Saini, French Fries, chips, doughnut, fried chicken/fish): 2 per day     7.) Have you had a fall two or more times in the past year?: Yes         Vision and hearing screens:    Hearing Screening    125Hz 250Hz 500Hz 1000Hz 2000Hz 3000Hz 4000Hz 6000Hz 8000Hz   Right ear:            Left ear:               Visual Acuity Screening    Right eye Left eye Both eyes   Without correction: 20/25 20/25 20/25   With correction:          Advance Directive:   The patient has the following documents:  Power of  for Health Care    Cognitive Assessment: no evidence of cognitive dysfunction by direct observation    Recent PHQ 2/9 Score    PHQ 2:  Date Adult PHQ 2 Score Adult PHQ 2 Interpretation   12/12/2020 0 No further screening needed       PHQ 9:  Date Adult PHQ 9 Score   11/21/2019 0       DEPRESSION ASSESSMENT/PLAN:  Depression screening is negative no further plan needed.     Body mass index is 24.47 kg/m².    BMI ASSESSMENT/PLAN:  Patient BMI is within normal range.     ------------------------------------------------------------------------    REVIEW OF SYSTEMS: The patient DENIES symptoms of:   General: Fever, chills, night sweats, fatigue, weight loss, weight gain, decreased appetite.  Skin: Rash, itching, lumps or bumps or moles that are changing, hair changes, nail changes.  Eyes: Visual blurring, double vision, spots before the eyes, eye pain.  Ears: Decreased auditory acuity, ringing or tinnitus in the ears, pain in the ears, discharge from the ears.  Nose: Nosebleed, discharge from the nose, decrease in ability to smell.  Mouth: Soreness of the mouth or tongue or lips, abnormality of the teeth or gums.  Throat: Sore throat, hoarseness or voice change.  Neck: Stiffness or pain in the neck.  Breasts:  Changes of the breasts, lumps or bumps in the breasts, discharge from the nipples, pain in the breasts, nipple changes.  Cardiorespiratory: Chest pain, edema, cough, hemoptysis, wheeze, shortness of breath.  Gastrointestinal: Abdominal pain, nausea, vomiting, constipation, diarrhea, black tarry stools, blood in the stools, change in the bowel habits, sour burps or heartburn, indigestion.  Genitourinary: Urgency, frequency, dysuria, hematuria, nocturia, vaginal discharge, vaginal itching, abnormal vaginal bleeding or pain.  Neurologic: Headache, weakness, loss of sensation, visual disturbance, trouble with balance or coordination, loss of memory.  Musculoskeletal: Joint pain, muscle pain.  Psychiatric: Symptoms of depression, feeling sad or blue.    PHYSICAL EXAM:    Visit Vitals  BP (!) 172/80   Pulse 80   Resp 16   Ht 5' 6.5\" (1.689 m)   Wt 69.8 kg   BMI 24.47 kg/m²      GENERAL APPEARANCE: Appears stated age, is in no apparent distress, is well developed and well nourished.  SKIN: Normal color for ethnicity, normal texture, good turgor, no skin rashes, no atypical-appearing skin lesions, no bruises.  LYMPH NODES: No cervical, supraclavicular, axillary or inguinal adenopathy.   HEAD: Normocephalic.  EYES: Pupils are equal and reactive to light and accommodation, extraocular movements are full, sclerae and conjunctivae are normal, lids and lashes are normal.  EARS: Pinnae and external ears are normal bilaterally, external auditory canals are normal, tympanic membranes are normal,  there is no tragal tenderness, auditory acuity is grossly intact.   NECK: Neck is supple, no thyromegaly, trachea is midline.  CHEST: Configuration normal, nontender to palpation, respiratory effort is not labored.  BREASTS: Breasts symmetric, skin shows no dimpling or induration, no nipple changes, no nipple discharge, no palpable nodules, no breast discomfort.  LUNGS: Lungs are clear to auscultation with normal inspiratory/expiratory  sounds, no rales, no rhonchi, no wheezes.  CARDIOVASCULAR: normal S1 AND S2, no murmur  ABDOMEN: Abdomen is soft, normal active bowel sounds, nontender, without masses, without hepatomegaly or splenomegaly, no pulsatile masses.  BACK: Inspection shows normal curvature, no discomfort to palpation of the midline, no costovertebral angle discomfort to palpation.  EXTREMITIES: No clubbing, no cyanosis, no edema, normal muscle tone and development bilaterally.  NEUROLOGIC:  Alert, appropriate, oriented x 3.  Speech fluent. Cranial nerves 2-12 intact, motor strength intact and symmetric, no apraxia or ataxia observed, deep tendon reflexes normal and symmetric, no tremor noted.  PSYCHIATRIC:  Affect appropriate, insight fair, mood good.      1. Medicare annual wellness visit, initial    See orders.   See Patient Instructions section.   Return in about 1 year (around 12/14/2021) for Medicare Wellness Visit.   HRAs discussed.  Labs ordered.     2. Essential hypertension    Continue on HCTZ 25 mg daily. , blood pressure elevated, recommended nurse visit.     3. Mixed hyperlipidemia    ASCVD risk score discussed. Recommended patient to be on a statin, which patient is agreeable to. Prescribing Atorvastatin 10 mg daily. Discussed possible side effects, such as arthralgia and Hepatitis. Patient is to keep us posted on how she's doing. We will check LFT in a month.     - HEPATIC FUNCTION PANEL; Future    The 10-year ASCVD risk score (Antoinette DC Jr., et al., 2013) is: 12.7%    Values used to calculate the score:      Age: 63 years      Sex: Female      Is Non- : No      Diabetic: No      Tobacco smoker: No      Systolic Blood Pressure: 172 mmHg      Is BP treated: Yes      HDL Cholesterol: 62 mg/dL      Total Cholesterol: 207 mg/dL    4. Screening for osteoporosis    - BD DEXA AXIAL SKELETON; Future      On 12/14/2020, Laurence AMAYA scribed the services personally performed by Windy Carpenter MD    The  documentation recorded by the scribe accurately and completely reflects the service(s) I personally performed and the decisions made by me.          normal...

## 2021-11-20 NOTE — ED STATDOCS - PATIENT PORTAL LINK FT
You can access the FollowMyHealth Patient Portal offered by [facility] by registering at the following website: [email or web address]. By joining [facility]’s FollowMyHealth portal, you will also be able to view your health information using other applications (apps) compatible with our system.

## 2021-11-20 NOTE — ED STATDOCS - OBJECTIVE STATEMENT
45 year old male presents to the ED for further evaluation of a rash and itchiness x yesterday. Pt reports that he was placed on Amoxicillin for 6 days, with last dose coming yesterday, for diverticulitis. Pt reports that after his last dose yesterday, he developed rash and itching across his body. Denies abd pain. n/v/d. No other injuries or complaints.   ID: [id number]

## 2021-11-20 NOTE — ED STATDOCS - PROGRESS NOTE DETAILS
pt with allergic rx likely 2/2 abx he was given for diverticulitis, will d/c abx give prednisone, pepcid and benadryl in ED and d/c home with prednisone x4 days, return precautions given  Rosaura Logan PA-C

## 2021-12-25 ENCOUNTER — EMERGENCY (EMERGENCY)
Facility: HOSPITAL | Age: 46
LOS: 0 days | Discharge: HOME | End: 2021-12-25
Attending: EMERGENCY MEDICINE
Payer: MEDICAID

## 2021-12-25 VITALS
OXYGEN SATURATION: 100 % | HEART RATE: 87 BPM | SYSTOLIC BLOOD PRESSURE: 120 MMHG | TEMPERATURE: 99 F | HEIGHT: 65 IN | WEIGHT: 169.98 LBS | DIASTOLIC BLOOD PRESSURE: 72 MMHG | RESPIRATION RATE: 18 BRPM

## 2021-12-25 VITALS
DIASTOLIC BLOOD PRESSURE: 68 MMHG | SYSTOLIC BLOOD PRESSURE: 128 MMHG | RESPIRATION RATE: 18 BRPM | OXYGEN SATURATION: 99 % | HEART RATE: 82 BPM | TEMPERATURE: 99 F

## 2021-12-25 DIAGNOSIS — U07.1 COVID-19: ICD-10-CM

## 2021-12-25 DIAGNOSIS — J02.9 ACUTE PHARYNGITIS, UNSPECIFIED: ICD-10-CM

## 2021-12-25 DIAGNOSIS — R50.9 FEVER, UNSPECIFIED: ICD-10-CM

## 2021-12-25 DIAGNOSIS — R05.1 ACUTE COUGH: ICD-10-CM

## 2021-12-25 LAB
FLUAV AG NPH QL: SIGNIFICANT CHANGE UP
FLUBV AG NPH QL: SIGNIFICANT CHANGE UP
RSV RNA NPH QL NAA+NON-PROBE: SIGNIFICANT CHANGE UP
S PYO AG SPEC QL IA: NEGATIVE — SIGNIFICANT CHANGE UP
SARS-COV-2 RNA SPEC QL NAA+PROBE: DETECTED

## 2021-12-25 PROCEDURE — 0241U: CPT

## 2021-12-25 PROCEDURE — 99283 EMERGENCY DEPT VISIT LOW MDM: CPT

## 2021-12-25 PROCEDURE — 87880 STREP A ASSAY W/OPTIC: CPT

## 2021-12-25 PROCEDURE — 99284 EMERGENCY DEPT VISIT MOD MDM: CPT

## 2021-12-25 PROCEDURE — 87081 CULTURE SCREEN ONLY: CPT

## 2021-12-25 RX ORDER — DEXAMETHASONE 0.5 MG/5ML
4 ELIXIR ORAL ONCE
Refills: 0 | Status: COMPLETED | OUTPATIENT
Start: 2021-12-25 | End: 2021-12-25

## 2021-12-25 RX ADMIN — Medication 4 MILLIGRAM(S): at 04:51

## 2021-12-25 NOTE — ED PROVIDER NOTE - NSFOLLOWUPINSTRUCTIONS_ED_ALL_ED_FT
Faringitis  Pharyngitis       La faringitis ocurre cuando hay enrojecimiento, dolor e hinchazón (inflamación) en la garganta (faringe). Es juliana causa muy común de dolor de garganta. La faringitis puede ser causada por juliana bacteria, fred por lo general la provoca un virus. La mayoría de los casos de faringitis se curan sin tratamiento.    ¿Cuáles son las causas?  Esta afección puede ser causada por lo siguiente:    Infección por virus (viral). La faringitis viral se contagia de juliana persona a otra (es contagiosa) al toser, estornudar y compartir objetos o utensilios personales jenny tazas, tenedores, cucharas, cepillos de diente.  Infección por bacterias (bacteriana). La faringitis bacteriana se puede contagiar al tocarse la nariz o shari luego de entrar en contacto con la bacteria, o a través de un contacto más íntimo, jenny por ejemplo, al besarse.  Alergias. Las alergias pueden causar juliana acumulación de mucosidad en la garganta (goteo posnasal) que deriva en la inflamación e irritación. A grover vez, las alergias pueden bloquear las fosas nasales, lo cual hace que se deba respirar por la boca, y esto seca e irrita la garganta.    ¿Qué incrementa el riesgo?  Es más probable que desarrolle esta afección si:    Tiene entre 5 y 24 años.  Está en lugares muy concurridos, tales jenny guardería, escuela o vivir en juliana residencia estudiantil.  Vive en un ambiente de clima frío.  Tiene debilitado el sistema que combate las enfermedades (inmunitario).    ¿Cuáles son los signos o síntomas?  Los síntomas de esta condición varían según la causa (viral, bacteriana o alergia) y pueden incluir los siguientes:    Dolor de garganta.  Fatiga.  Fiebre no muy wili.  Dolor de lurdes.  Angelina musculares y en las articulaciones.  Erupciones cutáneas.  Ganglios hinchados en la garganta (ganglios linfáticos).  Película parecida a las placas en la garganta o amígdalas. Por lo general, esto es un síntoma de faringitis bacteriana.  Vómitos.  Nariz tapada (congestión nasal).  Tos.  Ojos rojos con picazón (conjuntivitis).  Pérdida del apetito.    ¿Cómo se diagnostica?  Generalmente, esta afección se diagnostica en función de los antecedentes médicos y de un examen físico. El médico le hará preguntas sobre la enfermedad y elizabeth síntomas. Puede que se preston un cultivo de gorver garganta para buscar bacterias (prueba rápida para estreptococos estreptococos). También es posible que se realicen otros análisis de laboratorio, según la posible causa, aunque esto es poco común.    ¿Cómo se trata?  Generalmente, esta afección mejora en el término de 3 o 4 días sin medicamentos. La faringitis bacteriana puede tratarse con antibióticos.    Siga estas indicaciones en grover casa:  [place] los medicamentos de venta sachin y los recetados solamente jenny se lo haya indicado el médico.    Si le recetaron un antibiótico, tómelo jenny se lo haya indicado el médico. No deje de xi los antibióticos aunque comience a sentirse mejor.  No le administre aspirina a los niños por el riesgo de que contraigan el síndrome de [place].  Dorys gran cantidad de líquido para mantener la orina de estrellita yina o color amarillo pálido.  Descanse lo suficiente.  Preston gárgaras con juliana mezcla de agua y sal 3 o 4 veces al día, o cuando sea necesario. Para preparar la mezcla de agua con sal, disuelva por completo de media a 1 cucharadita de sal en 1 taza de agua tibia.  Si grover médico lo aprueba, puede usar pastillas o aerosoles para calmar la garganta.    Comuníquese con un médico si:  Tiene bultos grandes y dolorosos en el zoe.  Tiene juliana erupción cutánea.  Cuando tose elimina juliana expectoración lia, amarillo amarronado o con rasheed.    Solicite ayuda de inmediato si:  El zoe se pone rígido.  Comienza a babear o no puede tragar líquidos.  No puede beber ni xi medicamentos sin vomitar.  Siente un dolor intenso que no se va, incluso luego de xi los medicamentos.  Tiene dificultades para respirar, y no a causa de la congestión nasal.  Experimenta un nuevo dolor e hinchazón de las articulaciones jenny las rodillas, tobillos, muñecas o codos.    Resumen  La faringitis ocurre cuando hay enrojecimiento, dolor e hinchazón (inflamación) en la garganta (faringe).  Si derek la faringitis puede ser causada por juliana bacteria, la causa más común son los virus.  La mayoría de los casos de faringitis se curan sin tratamiento.  La faringitis bacteriana se trata con antibióticos.    NOTAS ADICIONALES E INSTRUCCIONES    Please follow up with your Primary MD in 24-48 hr.  Seek immediate medical care for any new/worsening signs or symptoms.

## 2021-12-25 NOTE — ED ADULT NURSE NOTE - OBJECTIVE STATEMENT
45 y/o male comes into ed for c/o of "sore throat" airway patent and shows no s/s of respiratory distress.

## 2021-12-25 NOTE — ED ADULT NURSE NOTE - NSIMPLEMENTINTERV_GEN_ALL_ED
Implemented All Universal Safety Interventions:  [place] to call system. Call bell, personal items and telephone within reach. Instruct patient to call for assistance. Room bathroom lighting operational. Non-slip footwear when patient is off stretcher. Physically safe environment: no spills, clutter or unnecessary equipment. Stretcher in lowest position, wheels locked, appropriate side rails in place.

## 2021-12-25 NOTE — ED PROVIDER NOTE - OBJECTIVE STATEMENT
45 y/o M with no PMHx or social history p/w sore throat, chills and cough for the past 24 hours.  Pt denies any recent travel.  Pt notes having Covid in November 2020 and had 2 doses of mRNA vaccine.  Pt denies CP or SOB.

## 2021-12-27 ENCOUNTER — EMERGENCY (EMERGENCY)
Facility: HOSPITAL | Age: 46
LOS: 0 days | Discharge: HOME | End: 2021-12-27
Attending: EMERGENCY MEDICINE
Payer: MEDICAID

## 2021-12-27 VITALS
OXYGEN SATURATION: 97 % | SYSTOLIC BLOOD PRESSURE: 122 MMHG | HEART RATE: 90 BPM | DIASTOLIC BLOOD PRESSURE: 90 MMHG | RESPIRATION RATE: 19 BRPM | TEMPERATURE: 99 F

## 2021-12-27 VITALS
HEIGHT: 65 IN | SYSTOLIC BLOOD PRESSURE: 123 MMHG | TEMPERATURE: 99 F | WEIGHT: 179.9 LBS | RESPIRATION RATE: 18 BRPM | DIASTOLIC BLOOD PRESSURE: 89 MMHG | OXYGEN SATURATION: 96 % | HEART RATE: 90 BPM

## 2021-12-27 DIAGNOSIS — R50.9 FEVER, UNSPECIFIED: ICD-10-CM

## 2021-12-27 DIAGNOSIS — U07.1 COVID-19: ICD-10-CM

## 2021-12-27 DIAGNOSIS — R51.9 HEADACHE, UNSPECIFIED: ICD-10-CM

## 2021-12-27 LAB
CULTURE RESULTS: SIGNIFICANT CHANGE UP
SPECIMEN SOURCE: SIGNIFICANT CHANGE UP

## 2021-12-27 PROCEDURE — 94640 AIRWAY INHALATION TREATMENT: CPT

## 2021-12-27 PROCEDURE — 99284 EMERGENCY DEPT VISIT MOD MDM: CPT

## 2021-12-27 PROCEDURE — 99282 EMERGENCY DEPT VISIT SF MDM: CPT

## 2021-12-27 RX ORDER — ALBUTEROL 90 UG/1
2 AEROSOL, METERED ORAL ONCE
Refills: 0 | Status: COMPLETED | OUTPATIENT
Start: 2021-12-27 | End: 2021-12-27

## 2021-12-27 RX ORDER — ONDANSETRON 8 MG/1
1 TABLET, FILM COATED ORAL
Qty: 15 | Refills: 0
Start: 2021-12-27 | End: 2021-12-31

## 2021-12-27 RX ORDER — IBUPROFEN 200 MG
1 TABLET ORAL
Qty: 30 | Refills: 0
Start: 2021-12-27

## 2021-12-27 RX ORDER — IBUPROFEN 200 MG
600 TABLET ORAL ONCE
Refills: 0 | Status: COMPLETED | OUTPATIENT
Start: 2021-12-27 | End: 2021-12-27

## 2021-12-27 RX ADMIN — Medication 600 MILLIGRAM(S): at 03:21

## 2021-12-27 RX ADMIN — ALBUTEROL 2 PUFF(S): 90 AEROSOL, METERED ORAL at 03:21

## 2021-12-27 NOTE — ED PROVIDER NOTE - CLINICAL SUMMARY MEDICAL DECISION MAKING FREE TEXT BOX
Pt with recent dx of Covid 19 p/w fever.  Pt is in NAD and denies CP or SOB.  plan: antipyretics and strict return precautions

## 2021-12-27 NOTE — ED PROVIDER NOTE - PATIENT PORTAL LINK FT
You can access the FollowMyHealth Patient Portal offered by [facility] by registering at the following website: [email or web address]. By joining [facility]’s FollowMyHealth portal, you will also be able to view your health information using other applications (apps) compatible with our system.

## 2021-12-27 NOTE — ED PROVIDER NOTE - OBJECTIVE STATEMENT
47 y/o M p/w continued dull headache and fever despite taking Tylenol at home.  Pt was dx with Covid 19 2 days ago.  Pt denies CP or SOB.  He is in NAD.

## 2021-12-27 NOTE — ED ADULT NURSE NOTE - CHIEF COMPLAINT QUOTE
Patient reports he tested positive for covid on saturday and is complaining of fevers and headache. Pt reports he took tylenol 2 hours PTA and it has not helped. No respiratory distress noted in triage.

## 2021-12-27 NOTE — ED PROVIDER NOTE - NSFOLLOWUPINSTRUCTIONS_ED_ALL_ED_FT
Educación para el paciente: Información general sobre el COVID-19 (Conceptos Básicos)  View in   language     Redactado por los médicos y editores de [employer]ToDate  There is a newer version of this topic available in English.Hay juliana versión de ashley artículo más actualizada disponible en Inglés.  View in Russian  View in [place]ese Micronesian  View in Afghan  View in Polish  View in Georgian    View video in French    ¿Qué es el COVID-19?  COVID-19 significa “enfermedad por coronavirus 2019”. La causa de la enfermedad es un virus llamado SARS-CoV-2. El virus apareció por primera vez a fines de 2019 y se propagó rápidamente por todo el abiel.    ¿Cuáles son los síntomas del COVID-19?  Los síntomas suelen comenzar 4 o 5 días después de que la persona se infecta con el virus. Sin embargo, en algunos casos pueden tardar hasta 2 semanas en aparecer. Hay personas que no tienen ningún síntoma.    Cuando aparecen síntomas, algunos pueden ser:    ?Fiebre    ?Tos    ?Dificultad para respirar    ?Sensación de cansancio    ?Escalofríos    ?Angelina musculares    ?Dolor de lurdes    ?Dolor de garganta    ?Problemas con el sentido del olfato o del gusto    Algunas personas tienen problemas digestivos jenny náuseas o diarrea. También se hogan informado algunos casos de sarpullidos u otros síntomas en la piel. Por ejemplo, algunas personas que tienen COVID-19 desarrollan manchas de color rojizo-romeo en los dedos de las verenice o de los pies. Sin embargo, no es yina por qué ocurre ni con qué frecuencia.    En la mayoría de los casos, los síntomas mejoran al cabo de algunas semanas. Sin embargo, un número reducido de personas se enferma gravemente y pierde la capacidad de respirar por grover cuenta. En casos graves, los órganos trent de funcionar, lo que puede causar la muerte.    Algunas personas que contraen COVID-19 siguen teniendo síntomas eleno semanas o meses. Seabeck parece más probable en el crystal de quienes se enferman al punto de tener que hospitalizarse. Sin embargo, también puede sucederles a personas que no se enferman muy gravemente. Los médicos todavía están aprendiendo sobre los efectos del COVID-19 a asmita plazo.    Si derek los niños pueden contraer COVID-19, elizabeth probabilidades de tener síntomas graves son menores que las de los adultos. Se ally más información sobre el COVID-19 en niños por separado. (Alecia "Educación para el paciente: El COVID-19 y los niños (Conceptos Básicos)").    ¿Juventino el riesgo de enfermarme gravemente?  Depende de grover edad y de grover estado de liudmila. En algunas personas, el COVID-19 causa problemas graves jenny neumonía, falta de oxígeno, problemas cardíacos o incluso la muerte. El riesgo aumenta con la edad. Además, es mayor en personas que tienen otros problemas de liudmila, jenny enfermedad cardíaca grave, enfermedad renal crónica, diabetes tipo 2, enfermedad pulmonar obstructiva crónica (EPOC), enfermedad falciforme u obesidad. Las personas que tienen el sistema inmunitario debilitado por otros motivos (por ejemplo, a causa de juliana infección por VIH o ciertas medicinas), asma, fibrosis quística, diabetes tipo 1 o presión arterial wili también podrían correr un riesgo más elevado de desarrollar problemas graves.    ¿Cómo se contagia el COVID-19?  El virus que causa COVID-19 se transmite principalmente de persona a persona. En general, esto ocurre cuando juliana persona infectada tose, estornuda o habla cerca de otras personas. El virus se transmite a través de unas partículas diminutas provenientes de los pulmones y las vías respiratorias de la persona infectada. Estas partículas pueden desplazarse fácilmente por el aire hacia otras personas cercanas. En algunos casos, jenny por ejemplo en los espacios cerrados donde circula siempre el mismo aire, el virus presente en las partículas podría esparcirse a otras personas que se encuentran a más distancia.    El virus puede contagiarse fácilmente entre las personas que viven juntas. Sin embargo, también puede transmitirse en reuniones en las que los presentes hablan de cerca, se dan la mano, se abrazan, comparten alimentos o hasta cantan juntos. [place] en restaurantes aumenta el riesgo de infección, ya que los comensales suelen sentarse cerca unos de otros y no cubrirse la shari. Los médicos también creen que es posible infectarse al tocar juliana superficie en la que se encuentra el virus y luego tocarse la boca, la nariz o los ojos. Sin embargo, probablemente esto no sea muy frecuente.    Es posible infectarse y contagiar a otras personas incluso sin tener síntomas.    ¿Existen distintas variantes del virus que causa COVID-19?  Sí. Los virus cambian, o "mutan", permanentemente. Cuando esto ocurre, puede formarse juliana nueva cepa o "variante". La mayoría de las veces, las nuevas variantes no alteran la forma en que el virus actúa. Sin embargo, cuando juliana variante presenta cambios en partes importantes del virus, ashley puede actuar de manera diferente.    Los expertos hogan descubierto juliana serie de variantes nuevas del virus que causa COVID-19. Al parecer, ciertas variantes se propagan más fácilmente que el virus original. Además, las personas que las contraen podrían enfermarse más.    Los expertos están estudiando las distintas variantes. Eso los ayudará a comprender mejor cuánto se hogan propagado, si producen un efecto distinto y qué brennan efectiva es la protección que cada vacuna ally contra ellas.    Mientras más personas se vacunen contra el COVID-19, más difícil será que el virus desarrolle nuevas variantes.    ¿Existe alguna prueba para detectar el virus que causa COVID-19?  Sí. Si el médico o enfermero sospecha que usted tiene COVID-19, podría xi juliana muestra del interior de grover nariz o grover boca con un hisopo para que le jan pruebas. En algunos casos podría xi juliana muestra de saliva. Estas pruebas pueden ayudar al médico a determinar si usted tiene COVID-19 u otra enfermedad.    Hay dos tipos de pruebas que se usan para diagnosticar el COVID-19:    ?Pruebas moleculares – En estas pruebas se busca el material genético del virus. También se conocen jenny "pruebas de ácido nucleico". Puede hacerse juliana prueba molecular en el consultorio de un médico, en juliana clínica o en la farmacia. También hay sitios donde se ofrecen estas pruebas a muchas personas, generalmente en lugares donde la gente se realiza la prueba dentro de grover vehículo. Según el laboratorio, los resultados de la prueba pueden tardar hasta varios días.    Las pruebas moleculares son la mejor manera de determinar si juliana persona tiene COVID-19, ya que pueden detectar incluso niveles muy bajos de virus en el cuerpo.    ?Pruebas de antígenos – En estas pruebas se buscan las proteínas del virus. Los resultados pueden demorar menos que los de la mayoría de las pruebas moleculares. Las pruebas de antígenos pueden realizarse en el consultorio de un médico, en juliana clínica, en la farmacia o mediante algunas organizaciones que realizan estas pruebas en otros lugares. También pueden realizarse en el hogar.    Las pruebas de antígenos no son brennan precisas jenny las pruebas moleculares. Es más probable que arrojen "falsos negativos". Cuando eso ocurre, los resultados de la prueba son negativos fred en verdad la persona está infectada. Sin embargo, pueden ser útiles en algunas situaciones, cuando se necesitan los resultados sin demoras o si no es posible realizar juliana prueba molecular. Por ejemplo, si juliana persona tiene síntomas de COVID-19, juliana prueba de antígenos puede detectar el virus en el cuerpo con suficiente precisión. Si el resultado de juliana prueba de antígenos es negativo, es posible que se necesite juliana prueba molecular para confirmar que la persona no tiene el virus en el cuerpo; por ejemplo, si la persona tiene síntomas o sabe que se expuso al virus.    También hay juliana prueba de rasheed que permite saber si juliana persona tuvo COVID-19 anteriormente. [place]ck se denomina prueba de "anticuerpos". No suelen utilizarse solamente las pruebas de anticuerpos para diagnosticar el COVID-19 o xi decisiones en cuanto a la atención médica. Sin embargo, los expertos pueden usarlas para determinar cuántas personas se infectaron sin saberlo en juliana xu determinada.    ¿Se puede prevenir el COVID-19?  La mejor manera de prevenir el COVID-19 es vacunarse. En [place], las primeras vacunas estuvieron disponibles a fines de 2020. Las personas mayores de 5 años pueden recibir la vacuna.    Si juliana cantidad suficiente de personas recibe la vacuna, el virus dejará de propagarse con tanta rapidez. Se ally más información sobre las vacunas contra el COVID-19, y las actividades que se pueden realizar después de vacunarse, por separado. (Alecia "Educación para el paciente: Vacunas contra el COVID-19 (Conceptos Básicos)").    Los expertos consideran que las vacunas serán azar de los modos más importantes de controlar la pandemia de COVID-19. Las personas que reciben la vacunación completa tienen un riesgo mucho yvette de contraer el virus.    Si no se ha vacunado todavía, hay otras maneras de ayudar a protegerse y proteger a otros:    ?Practicar el "distanciamiento social". Lo más importante es evitar el contacto con personas enfermas, fred el distanciamiento social también significa mantenerse a 6 pies (alrededor de 2 metros) de distancia de cualquier persona que no viva con usted. Eso se debe a que el virus puede propagarse fácilmente a través del contacto cercano, y no siempre es posible saber quién está infectado.    ?Colóquese juliana máscara cuando tenga que salir de grover casa y rodearse de otras personas. [place] se debe principalmente a que, si se infecta, incluso si no tiene síntomas, es menos probable que transmita la infección a otras personas. Además, podría ayudar a protegerlo de personas que podrían estar infectadas. Asegúrese de que la máscara le cubra la boca y la nariz.    Puede comprar máscaras de myra y máscaras desechables (que no iglesia de uso médico) en tiendas o en línea. Las máscaras de myra son más efectivas si tienen varias capas de myra. La máscara debe quedarle derek ajustada sobre la shari, sin espacios. Para conseguirlo, puede usar juliana máscara que tenga un alambre ajustable para la nariz, ajustar o atar las bandas elásticas para las orejas a fin de que la máscara le quede más ajustada, o colocarse juliana máscara de myra sobre juliana máscara desechable.    Al quitarse la máscara, asegúrese de no tocarse los ojos, la nariz o la boca. Después de tocar la máscara, lávese las verenice. Las máscaras de myra se pueden radha con el teodoro de la ropa sucia.    Cuando se encuentre al aire sachin y no haya personas cerca, es posible que no necesite usar la máscara. Sin embargo, es importante que conozca las normas de grover xu. Los Centros para el Control y la Prevención de Enfermedades de [place] (CDC) ofrecen más información sobre el uso correcto de las máscaras: www.cdc.gov/coronavirus/2019-ncov/prevent-getting-sick/about-face-coverings.html.    ?Lávese las verenice con agua y jabón frecuentemente. [place] es particularmente importante después de amanda estado en público o de amanda tocado superficies que muchas personas también tocan, jenny picaportes o pasamanos. Es probable que el riesgo de infectarse al tocar objetos de ashley tipo no sea muy alto. De todos modos, es conveniente lavarse las verenice con frecuencia. [place] también ayuda a protegerlo de otras enfermedades, jenny la gripe y el resfriado común.    Asegúrese de refregarse las verenice con jabón eleno un mínimo de 20 segundos, y de limpiarse las muñecas, las uñas y la piel entre los dedos. Luego, enjuáguese las verenice y séquelas con juliana toalla de papel que pueda tirar a la basura. Si no tiene un lavabo cerca, puede limpiarse con un gel higienizante para verenice. Los más efectivos son aquellos que contienen un mínimo de 60 % de alcohol. Sin embargo, lo mejor es lavarse las verenice con jabón y agua, si es posible.    ?Evite tocarse la shari, especialmente la boca, la nariz y los ojos.    ?Evite o limite los viajes, si puede. Cualquier tipo de viaje, especialmente si implica estar en lugares concurridos tales jenny aeropuertos, aumenta el riesgo de contraer y propagar la infección.    Si necesita viajar, asegúrese de verificar si se hogan implementado normas en relación con el COVID-19 en la xu que planea visitar. En algunos lugares de [place], las personas que viajan a otro estado (o que regresan de allí) deben permanecer en "autocuarentena" eleno cierto tiempo. Eso implica que no pueden salir a espacios públicos ni estar cerca de otras personas. En [place] también se exige juliana prueba de COVID-19 negativa a todas las personas que ingresan o regresan al país. Muchos otros países también tienen requisitos con respecto a las pruebas. El propósito de todas estas normas es ayudar a desacelerar la propagación del COVID-19.    Cuando haya recibido la vacunación completa, grover probabilidad de contraer el virus será mucho yvette. "Vacunación completa" significa que ha recibido todas las dosis de la vacuna y hogan transcurrido al menos 2 semanas desde la última dosis. (Si recibió juliana vacuna de dosis única, grover vacunación será completa 2 semanas después de haberse vacunado).    ¿Qué jamila hacer si tengo síntomas?  Si tiene fiebre, tos, dificultad para respirar u otros síntomas de COVID-19, llame a grover médico o enfermero, quien le hará preguntas sobre elizabeth síntomas. También podría preguntarle sobre los viajes que haya hecho recientemente y si ha estado cerca de alguna persona que podría haberse infectado. Luego podrá indicarle si debe dirigirse al consultorio o a otro sitio para realizarse la prueba.    Si elizabeth síntomas no son graves, es mejor que llame antes de ir. Ellos le dirán qué hacer y si deben atenderlo en persona. Muchas personas que solo tienen síntomas leves deben quedarse en casa y evitar estar cerca de otros hasta que mejoren. Si es necesario que vaya a la clínica o al hospital, no deje de usar máscara. De ivan modo ayudará a proteger a otras personas. Además, es posible que el personal sanitario le pida que espere en un sitio apartado de otras personas.    Incluso si está gravemente enfermo y tiene que ir a la clínica u [facility], debe llamar antes, si es posible. Así, el personal sanitario podrá atenderlo y también xi las medidas necesarias para proteger a los demás. Si jason que está teniendo juliana emergencia médica, pida juliana ambulancia (en . . y [place], marque 9-1-1).    ¿Qué ocurre si me siento derek fred creo que estuve expuesto?  Si jason que estuvo en contacto cercano con juliana persona que tenía COVID-19, lo que deba hacer a continuación dependerá de si ya tuvo COVID-19 o si ha recibido la vacuna:    ?En crystal de que no haya tenido COVID-19 ni se haya vacunado – Después de juliana posible exposición, debe realizarse juliana prueba de detección del virus, incluso si no tiene síntomas. Llame a grover médico o enfermero si no sabe con certeza dónde realizarse la prueba. Luego, permanezca en autocuarentena en grover hogar y vigile elizabeth síntomas. [place]abeck consiste en quedarse en grover casa en la medida de lo posible y guardar juliana distancia mínima de 6 pies (2 metros) de las demás personas del hogar.    Lo más seguro que puede hacer después de juliana posible exposición es permanecer en autocuarentena en grover hogar eleno 14 días. [place]ck puede presentar dificultades con el trabajo, el estudio u otras responsabilidades. Por lo tanto, algunos departamentos de liudmila pública podrían autorizar a las personas a finalizar la cuarentena más temprano, especialmente si obtuvieron un resultado negativo en juliana prueba detección. Si tiene dudas sobre la duración de grover cuarentena, comuníquese con el departamento de liudmila pública de grover localidad o pregunte a un médico o enfermero.    ?En crystal de que sí haya tenido COVID-19 o se haya vacunado – Si tuvo COVID-19 en los últimos 3 meses, no necesita permanecer en autocuarentena. Si tuvo COVID-19 fred fue hace más de 3 meses, siga los pasos anteriores.    Si recibió la vacunación completa, no necesita permanecer en autocuarentena. Sin embargo, aun así debe realizarse juliana prueba de detección entre 3 y 5 días después de amanda estado en contacto con la persona que tenía COVID-19. Si derek tiene muchas menos probabilidades de contraer la infección después de vacunarse, aún es posible que ocurra.    Si permanece en autocuarentena eleno menos de 14 días, o si no necesita permanecer en autocuarentena, deberá vigilar elizabeth síntomas de todos modos eleno la totalidad de los 14 días. Si aparece cualquier síntoma, infórmeselo a grover médico o enfermero de inmediato. También debe poner especial cuidado en el uso de la máscara y el distanciamiento social eleno ashley tiempo.    ¿Cómo se trata el COVID-19?  Muchas personas pueden recuperarse en grover casa. Sin embargo, quienes tienen síntomas graves u otros problemas de liudmila posiblemente tengan que ir al hospital.    ?Enfermedad leve – Si está levemente enfermo, podría tener síntomas jenny fiebre o tos, fred no dificultad para respirar. La mayoría de las personas que tienen COVID-19 se enferman levemente y pueden hacer reposo en grover casa hasta mejorarse. En general, esto ocurre en alrededor de 2 semanas, fred cada crystal es diferente.    Si se está recuperando del COVID-19, es importante que se quede en grover casa y practique el "autoaislamiento" hasta que grvoer médico o enfermero le diga que es seguro que deje de hacerlo. El autoaislamiento consiste en mantenerse lejos de otras personas, incluso de quienes vivan con usted. La duración del autoaislamiento dependerá de cuándo hayan comenzado elizabeth síntomas, y en algunos casos de si se hizo la prueba de detección y el resultado fue negativo (lo que indica que ya no tiene el virus en el cuerpo).    ?Enfermedad grave – Si se enferma más gravemente y tiene dificultad para respirar, es posible que deba recibir tratamiento en el hospital, quizás en la unidad de cuidados intensivos (UCI). Mientras esté allí, lo más probable es que permanezca en juliana habitación de aislamiento especial. Solo el personal médico tendrá acceso a la habitación, y deberá usar batas, guantes, máscaras y protección ocular especiales.    Los médicos y enfermeros pueden supervisar y complementar la respiración y otras funciones del cuerpo, y darle la mayor comodidad posible. Brenden vez necesite más oxígeno para que lo ayude a respirar fácilmente. Si le phillip mucho respirar, brenden vez necesite un tubo respiratorio. El tubo se coloca en la garganta y llega hasta los pulmones. Está conectado a juliana máquina que lo ayuda a respirar, llamada "respirador".    Los médicos están estudiando varios tratamientos posibles para el COVID-19. En ciertos casos, podrían recomendar tratamientos llamados "anticuerpos monoclonales". Estos tratamientos parecen ayudar a algunas personas en riesgo de enfermarse gravemente.    Los médicos también recomiendan participar en un estudio clínico. Un estudio clínico es un estudio científico en el que se prueban nuevas medicinas para alecia cómo funcionan. Asegúrese de no probar ninguna medicina ni tratamiento nuevo sin amanda hablado con un médico.    ¿Qué jamila hacer si vivo con alguien que tiene COVID-19?  Si vive con alguien que tiene COVID-19, hay algunas cosas más que puede hacer para protegerse a sí mismo y a los demás:    ?Mantenga al enfermo alejado de otras personas – El enfermo debe tener grover propia habitación y usar otro baño, si es posible. Además, debe comer en grover propia habitación.    Los expertos también recomiendan que la persona enferma se mantenga alejada de las mascotas de la casa hasta que se mejore.    ?Pida al enfermo que se coloque juliana máscara – La persona enferma debe usar máscara cuando haya otras personas en la misma habitación. Si el enfermo no puede colocarse juliana máscara, usted puede cubrirse la shari cuando se encuentren en la misma habitación, para ayudar a protegerse.    ?Lávese las verenice – Lávese las verenice con agua y jabón frecuentemente.    ?Limpie con frecuencia – Estas son algunas cosas específicas que pueden ser de ayuda:    •Póngase guantes desechables para limpiar. También es conveniente que se ponga guantes para tocar la ropa sucia, los platos, los utensilios y los desechos de la persona enferma. Lávese las verenice después de quitarse los guantes.    •Limpie periódicamente los objetos que se tocan mucho, jenny las encimeras, las mesas de noche, los picaportes, las computadoras, los teléfonos y las superficies de los trey.    •Limpie los objetos de grover casa con agua y jabón, fred también use desinfectantes en las superficies adecuadas. Algunos productos de limpieza son efectivos para eliminar las bacterias fred no los virus, por lo que es importante leer las etiquetas. La Agencia de Protección Ambiental (Environmental Protection Agency, EPA) de [place] tiene juliana lista de productos aquí: www.epa.gov/pesticide-registration/list-n-disinfectants-use-against-sars-cov-2.    ¿Qué sucede si estoy embarazada?  Se ally más información por separado sobre el COVID-19 eleno el embarazo. (Alecia "Educación para el paciente: COVID-19 y embarazo (Conceptos Básicos)").    Si está embarazada y tiene preguntas sobre el COVID-19, hable con grover médico, enfermero o partera, quien podrá brindarle ayuda.    ¿Qué puedo hacer para sobrellevar el estrés y la ansiedad?  Es normal sentir ansiedad o preocupación con respecto al COVID-19. También es normal sentir estrés, kitty o fastidio al no poder realizar elizabeth actividades habituales. Para cuidarse, puede intentar lo siguiente:    ?Distraerse de las noticias    ?Hacer ejercicio regularmente y comer alimentos saludables    ?Buscar actividades que disfrute y que pueda hacer en casa    ?Mantenerse en contacto con amigos y familiares    Podría ser útil recordar que al xi medidas tales jenny vacunarse y respetar las normas locales ayuda a proteger a otras personas de grover comunidad.    ¿Dónde puedo obtener más información?  A medida que se sepa más sobre ashley virus, las recomendaciones de los expertos seguirán cambiando. Consulte a grover médico o funcionario de liudmila pública para contar con la información más actualizada acerca de cómo protegerse y proteger a otros.    Para obtener información sobre el COVID-19 en grover xu, puede llamar al departamento de liudmila pública local. En [place], por lo general se trata del [place] de Liudmila Pública de la ciudad o del pueblo. Además, muchos estados disponen de juliana línea telefónica gratuita a la que puede llamar.    Puede encontrar más información sobre el COVID-19 en los siguientes sitios web:    ?Centros para el Control y la Prevención de Enfermedades de [place] (CDC): www.cdc.gov/COVID19    ?[employer] la Liudmila (OMS): www.who.int/emergencies/diseases/novel-coronavirus-2019

## 2021-12-27 NOTE — ED ADULT NURSE NOTE - OBJECTIVE STATEMENT
pt presents tp ED for HA and fever. states covid positive x 2 days. denies CP and SOB. last took tylenol 2 hours PTA.

## 2021-12-27 NOTE — ED ADULT TRIAGE NOTE - LANGUAGE ASSISTANCE NEEDED
Yes-Patient/Caregiver accepts free interpretation services... Attending Attestation (For Attendings USE Only)...

## 2021-12-27 NOTE — ED ADULT TRIAGE NOTE - CHIEF COMPLAINT QUOTE
1. Follow up in 6 months  2. In the meantime, try to lose weight through diet and exercise, reduction of salt intake  3. Bring a 14 day blood pressure/heart rate log to your next visit   Patient reports he tested positive for covid on saturday and is complaining of fevers and headache. Pt reports he took tylenol 2 hours PTA and it has not helped. No respiratory distress noted in triage.

## 2022-02-19 ENCOUNTER — EMERGENCY (EMERGENCY)
Facility: HOSPITAL | Age: 47
LOS: 0 days | Discharge: HOME | End: 2022-02-19
Attending: EMERGENCY MEDICINE
Payer: MEDICAID

## 2022-02-19 VITALS
RESPIRATION RATE: 18 BRPM | DIASTOLIC BLOOD PRESSURE: 71 MMHG | TEMPERATURE: 99 F | OXYGEN SATURATION: 100 % | HEART RATE: 68 BPM | SYSTOLIC BLOOD PRESSURE: 104 MMHG

## 2022-02-19 VITALS — WEIGHT: 179.02 LBS | HEIGHT: 65 IN

## 2022-02-19 DIAGNOSIS — T15.01XA FOREIGN BODY IN CORNEA, RIGHT EYE, INITIAL ENCOUNTER: ICD-10-CM
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
## 2022-02-19 DIAGNOSIS — H57.11 OCULAR PAIN, RIGHT EYE: ICD-10-CM

## 2022-02-19 PROCEDURE — 99283 EMERGENCY DEPT VISIT LOW MDM: CPT | Mod: 25

## 2022-02-19 PROCEDURE — 65220 REMOVE FOREIGN BODY FROM EYE: CPT | Mod: RT

## 2022-02-19 PROCEDURE — 65220 REMOVE FOREIGN BODY FROM EYE: CPT

## 2022-02-19 RX ORDER — TOBRAMYCIN 0.3 %
1 DROPS OPHTHALMIC (EYE) ONCE
Refills: 0 | Status: COMPLETED | OUTPATIENT
Start: 2022-02-19 | End: 2022-02-19

## 2022-02-19 RX ORDER — TOBRAMYCIN 0.3 %
1 DROPS OPHTHALMIC (EYE)
Qty: 1 | Refills: 0
Start: 2022-02-19 | End: 2022-02-23

## 2022-02-19 RX ADMIN — Medication 1 DROP(S): at 21:49

## 2022-02-19 RX ADMIN — Medication 1 DROP(S): at 22:32

## 2022-02-19 NOTE — ED ADULT NURSE NOTE - OBJECTIVE STATEMENT
pt presents to ED pt presents to ED s/p left eye discomfort, sensation of something within left eye.

## 2022-02-19 NOTE — ED STATDOCS - ATTENDING CONTRIBUTION TO CARE
I, Lilian Siddiqui MD,  performed the initial face to face bedside interview with this patient regarding history of present illness, review of symptoms and relevant past medical, social and family history.  I completed an independent physical examination.  I was the initial provider who evaluated this patient.   I personally saw the patient and performed a substantive portion of the visit including all aspects of the medical decision making.  I have signed out the follow up of any pending tests (i.e. labs, radiological studies) to the ACP.  I have communicated the patient’s plan of care and disposition with the ACP.  The history, relevant review of systems, past medical and surgical history, medical decision making, and physical examination was documented by the scribe in my presence and I attest to the accuracy of the documentation.

## 2022-02-19 NOTE — ED PROCEDURE NOTE - PROCEDURE ADDITIONAL DETAILS
tetracaine to L eye with gentle FB removal from cornea with qtip.  stained and woods lamp by PA.  no corneal abrasion or defect seen

## 2022-02-19 NOTE — ED STATDOCS - PROGRESS NOTE DETAILS
Patient seen and evaluated in intake with ED Attending,  ED attending note and orders reviewed, will continue with patient follow up and care, pt eyes stained with florescence and used woods lamp, no corneal abrasion, fb seen, pt aware to use drops as directed and fu with ophthalmologist. pt agrees with plan. -Silvia Thorne PA-C

## 2022-02-19 NOTE — ED STATDOCS - DR. NAME
CONSTITUTIONAL: in no acute distress, afebrile  SKIN: Warm, dry  EYES: No conjunctival injection. EOMI  ENT: No nasal discharge; airway clear  CARD:  Regular rate and rhythm  RESP: CTAB  ABD: Soft non distended, mild epigastric ttp, no flank or cva tenderness; No guarding or rebound tenderness  EXT: Normal ROM.  No clubbing or cyanosis.  No edema, non tender groin surgical site  NEURO: A&O x3, grossly unremarkable, no focal deficits  PSYCH: Cooperative, appropriate  *Chaperone was used during the encounter
Chen

## 2022-02-19 NOTE — ED STATDOCS - NSFOLLOWUPINSTRUCTIONS_ED_ALL_ED_FT
Cuerpo extraño en el adama    Eye Foreign Body      Un cuerpo extraño es un objeto en el adama que no debería estar allí. Podría ser cualquier objeto, jenny:  •Juliana noel de suciedad o polvo.      •Un pelo o juliana pestaña.      •Juliana astilla.      •Juliana pieza de metal, jenny cuando juliana pieza diminuta de metal vuela por el aire mientras la persona está trabajando con ciertas herramientas.      Si el objeto se encuentra fuera del globo ocular, generalmente se puede quitar con agua o lo extrae un médico. Un objeto que está dentro del globo ocular necesita tratamiento de emergencia de inmediato.      ¿Cuáles son las causas?    La causa de esta afección es que un objeto ingrese en el adama o lo golpee.      ¿Cuáles son los signos o síntomas?    Los síntomas frecuentes de esta afección incluyen los siguientes:  •Dolor e irritación.      •Sensación de tener algo en el adama.      •Lagrimeo.      •Enrojecimiento.      •“Anillos de óxido” pequeños alrededor del objeto si es metálico.      •Visión borrosa.      Si el objeto está dentro del globo ocular, puede causar:  •Mucho dolor.       •Pérdida de visión inmediata o visión borrosa.      •Un cambio en la forma de la parte linda del adama (distorsión de la pupila).        ¿Cómo se trata?    El tratamiento para un objeto que está en el exterior del globo ocular puede incluir:  •Que un médico extraiga el objeto del adama. El médico puede hacerlo lavando el adama o utilizando instrumentos pequeños. Si tiene óxido en el adama debido a un objeto metálico, el médico también retirará el óxido.      •Uso de gotas que adormecen el adama para ayudar con el dolor.      •Uso de un antibiótico en forma de gotas o de ungüento si el objeto raspó la córnea. La córnea es la cubierta transparente situada en la parte delantera del adama.      •Uso de juliana venda (parche ocular) sobre el adama.      Si tiene un cuerpo extraño dentro del globo ocular, necesitará cirugía de inmediato.    Es posible que necesite que lo tyrese un oculista (oftalmólogo) para recibir otros tratamientos.      Siga estas instrucciones en grover casa:     Medicamentos     •Use los medicamentos de venta sachin y los recetados solamente jenny se lo haya indicado el médico. Use las gotas oftálmicas o el ungüento jenny se le haya indicado.      •Si le recetaron gotas o ungüentos con antibiótico, úselos según las indicaciones del médico. No deje de usarlo aunque comience a sentirse mejor.      Instrucciones generales   •Si tiene un vendaje sobre el adama:  •Úselo jenny se lo hayan indicado. Siga las instrucciones del médico respecto de cuándo retirarlo.      •No conduzca ni use maquinaria mientras usa el vendaje.      •Si no tiene un vendaje sobre el adama:  •Mantenga el adama cerrado la mayor cantidad de tiempo posible.      •No se frote el adama.      •No use lentes de contacto hasta que sienta el adama con normalidad o según se lo haya indicado el médico.      •Use anteojos oscuros cuando esté expuesto a jaron brillante.      •Si hace actividades con alto riesgo de lesiones oculares, use protección para los ojos. Estas actividades incluyen el uso de herramientas de wili velocidad.        •Cumpla con todas las visitas de seguimiento.        Comuníquese con un médico si:    •Siente más dolor en el adama.      •Tiene problemas con el vendaje del adama.      •Le sale un líquido (secreción) del adama que no es normal.        Solicite ayuda de inmediato si:    •Grover capacidad para alecia (visión) empeora.      •Tiene más enrojecimiento e hinchazón alrededor del adama.        Resumen    •Un cuerpo extraño es un objeto en el adama que no debería estar allí.      •Un objeto fuera del globo ocular, generalmente se puede quitar con agua o lo extrae un médico. Un objeto dentro del globo ocular es juliana emergencia.      •Si tiene un vendaje sobre el adama (parche ocular), no conduzca mientras lo use.      •Si tiene más dolor en el adama, comuníquese con grover médico.      Esta información no tiene jenny fin reemplazar el consejo del médico. Asegúrese de hacerle al médico cualquier pregunta que tenga.

## 2022-02-19 NOTE — ED ADULT NURSE NOTE - NSIMPLEMENTINTERV_GEN_ALL_ED
Implemented All Universal Safety Interventions:  [place] to call system. Call bell, personal items and telephone within reach. Instruct patient to call for assistance. Room bathroom lighting operational. Non-slip footwear when patient is off stretcher. Physically safe environment: no spills, clutter or unnecessary equipment. Stretcher in lowest position, wheels locked, appropriate side rails in place.

## 2022-02-19 NOTE — ED STATDOCS - OBJECTIVE STATEMENT
46 year old male with no pertinent PMHx presents to the ED c/o foreign body in right eye. Pt reports that he is a  and was cleaning something with the water jet and something splashed in his right eye on 2/10. He noticed some discomfort in his right eye and with a flashlight, looked into a mirror and noticed something in his eye. No other injuries or complaints.

## 2022-05-12 NOTE — ED PROVIDER NOTE - TEMPLATE
Reached out to cardiology PSR. She will discuss further with the nurses to see who will be reading the Holter.    Abdominal Pain, N/V/D

## 2022-08-09 NOTE — ED PROVIDER NOTE - COVID-19 RESULT DATE/TIME
Tanner La Roche-Posay Products: No
Detail Level: Zone
Action 1: Continue
25-Dec-2021 05:14
Initiate Regimen: Ordinary azelaic acid suspension bid

## 2022-10-12 NOTE — ED PROVIDER NOTE - NS ED ATTENDING STATEMENT MOD
Procedure Note for Skin Lesion Excision of left forearm   Location: left ventral forearm   Indication: atypical   This procedure and the risks and benefits were discussed with the patient and verbal consent was obtained.   Preparation with alcohol   Characteristics of lesion: 2.2 cm brown papular   Anesthesia: 3ml of 2% lidocaine with epi  Technique of Shave or Punch: elliptical excision   Cautery Method: 7  4-0 ethilon sutures   Bandage applied  Specimen Sent: yes   Patient tolerated well without complications and wound care instructions given  Followup 10 days or sooner if needed
Attending Only

## 2023-01-16 NOTE — ED STATDOCS - ENMT, MLM
01/16/23      Brad Field  [address]  [facility] [address]      Dear Brad                  Your colonoscopy on 1/11/23 indicated:      [x]   Hyperplastic polyps: A type of benign (noncancerous) growth found in the colon.   [x]   Adenomatous polyps: An abnormal growth in the colon. While most colon polyps do not cause any problems, adenomatous polyps are thought to be the source of most colorectal cancer. Adenomatous polyps usually grow very slowly and it may be years before they turn into cancer, if they ever do. They usually are discovered during a routine colonoscopy and are removed. The discovery of adenomatous polyps in your colon means that you need to be screened for colorectal cancer more often than the average person. A repeat colonoscopy is recommended in five years.        If you have additional questions or concerns, please call the office to review.    Sincerely,      Dr. Ascencion García MD  General Surgeon                
Nasal mucosa clear.  Mouth with normal mucosa  Throat has no vesicles, no oropharyngeal exudates and uvula is midline.

## 2023-02-03 ENCOUNTER — EMERGENCY (EMERGENCY)
Facility: HOSPITAL | Age: 48
LOS: 0 days | Discharge: HOME | End: 2023-02-03
Attending: HOSPITALIST
Payer: COMMERCIAL

## 2023-02-03 VITALS
OXYGEN SATURATION: 100 % | RESPIRATION RATE: 18 BRPM | SYSTOLIC BLOOD PRESSURE: 117 MMHG | HEART RATE: 72 BPM | TEMPERATURE: 100 F | DIASTOLIC BLOOD PRESSURE: 83 MMHG

## 2023-02-03 DIAGNOSIS — B34.9 VIRAL INFECTION, UNSPECIFIED: ICD-10-CM

## 2023-02-03 DIAGNOSIS — J02.9 ACUTE PHARYNGITIS, UNSPECIFIED: ICD-10-CM

## 2023-02-03 DIAGNOSIS — R50.9 FEVER, UNSPECIFIED: ICD-10-CM

## 2023-02-03 DIAGNOSIS — Z20.822 CONTACT WITH AND (SUSPECTED) EXPOSURE TO COVID-19: ICD-10-CM

## 2023-02-03 DIAGNOSIS — R51.9 HEADACHE, UNSPECIFIED: ICD-10-CM

## 2023-02-03 LAB
FLUAV AG NPH QL: SIGNIFICANT CHANGE UP
FLUBV AG NPH QL: SIGNIFICANT CHANGE UP
RSV RNA NPH QL NAA+NON-PROBE: SIGNIFICANT CHANGE UP
S PYO AG SPEC QL IA: NEGATIVE — SIGNIFICANT CHANGE UP
SARS-COV-2 RNA SPEC QL NAA+PROBE: SIGNIFICANT CHANGE UP

## 2023-02-03 PROCEDURE — 87880 STREP A ASSAY W/OPTIC: CPT

## 2023-02-03 PROCEDURE — 99283 EMERGENCY DEPT VISIT LOW MDM: CPT | Mod: 25

## 2023-02-03 PROCEDURE — 87081 CULTURE SCREEN ONLY: CPT

## 2023-02-03 PROCEDURE — 96372 THER/PROPH/DIAG INJ SC/IM: CPT

## 2023-02-03 PROCEDURE — 99284 EMERGENCY DEPT VISIT MOD MDM: CPT

## 2023-02-03 PROCEDURE — 0241U: CPT

## 2023-02-03 RX ORDER — IBUPROFEN 200 MG
600 TABLET ORAL ONCE
Refills: 0 | Status: COMPLETED | OUTPATIENT
Start: 2023-02-03 | End: 2023-02-03

## 2023-02-03 RX ORDER — DEXAMETHASONE 0.5 MG/5ML
10 ELIXIR ORAL ONCE
Refills: 0 | Status: COMPLETED | OUTPATIENT
Start: 2023-02-03 | End: 2023-02-03

## 2023-02-03 RX ADMIN — Medication 600 MILLIGRAM(S): at 07:06

## 2023-02-03 RX ADMIN — Medication 10 MILLIGRAM(S): at 07:07

## 2023-02-03 NOTE — ED PROVIDER NOTE - PATIENT PORTAL LINK FT
You can access the FollowMyHealth Patient Portal offered by [facility] by registering at the following website: [email or web address]. By joining [employer]s FollowMyHealth portal, you will also be able to view your health information using other applications (apps) compatible with our system.

## 2023-02-03 NOTE — ED PROVIDER NOTE - PHYSICAL EXAMINATION
***GEN - NAD; well appearing; A+O x3 ***HEAD - NC/AT ***EYES/NOSE - + nasal congestion, PERRL, EOMI, mucous membranes moist, no discharge ***THROAT: Oral cavity and pharynx normal. No inflammation, swelling, exudate, or lesions.  ***NECK: Neck supple, non-tender  ***PULMONARY - CTA b/l, symmetric breath sounds. ***CARDIAC -s1s2, RRR, no M,G,R  ***ABDOMEN - +BS, ND, NT, soft, no guarding, no rebound, no masses   ***BACK - no CVA tenderness, Normal  spine ***EXTREMITIES - symmetric pulses, 2+ dp, capillary refill < 2 seconds, no clubbing, no cyanosis, no edema ***SKIN - no rash or bruising   ***NEUROLOGIC - alert, CN 2-12 intact

## 2023-02-03 NOTE — ED PROVIDER NOTE - OBJECTIVE STATEMENT
PI ID #[id number] for Romanian    47-year-old male with complaint of fever, body aches, sore throat, headache and cough for the past 3 days.  He has been taking ibuprofen and Tylenol once or twice a day for fever and body aches without much improvement.  States it hurts to swallow but he has been able to drink.  Did not have much up an appetite yesterday.  No known sick contacts.  No difficulty opening his mouth

## 2023-02-03 NOTE — ED ADULT NURSE NOTE - OBJECTIVE STATEMENT
Pt comes to ED c/o sore throat, chills, body aches x3 days. Pt reports taking tylenol and motrin 12 hours ago with no relief. Pt denies any SOB, CP, N/V/D at this time. Pt is aox4 in the ED and speaking in complete sentences. Pt is ambulatory with steady gait. Pt has hx IBS and NKDA.

## 2023-02-03 NOTE — ED ADULT TRIAGE NOTE - CHIEF COMPLAINT QUOTE
Patient presented ambulatory to the ED c/o multiple medical complaints. Patient reports fever, body aches, chills, sore throat, pain in throat when swallowing x 3 days. Patient reports taking tylenol and ibuprofen w no relief. Patient denies chest pain, sob, n/v/d, dizziness at this time. Pt reports pmhx of IBS. Patient denies allergies. Pt does not appear to be in any distress at this time. Temp in triage: 101.0 F.

## 2023-02-03 NOTE — ED ADULT NURSE NOTE - NS ED NURSE LEVEL OF CONSCIOUSNESS SPEECH
3moxo needs virtual check w/ me soon  
LOV:  LR:1-2019  No upcoming appointment please advise ok to refill  
PT IS NEEDING A REFILL ON SERTRALINE. PLEASE SEND TO: [facility] [place] ON [address]  
Spoke to patient and advised. Rx sent   
Speaking Coherently

## 2023-02-03 NOTE — ED PROVIDER NOTE - NSFOLLOWUPINSTRUCTIONS_ED_ALL_ED_FT
Take tylenol as needed for pain, 650Mg every 6-8 hours.  You can also take ibuprofen as needed for pain, 600mg every 6-8 hours, take with food.    You will be contacted at the number you provided with the results of viral swab.

## 2023-02-03 NOTE — ED PROVIDER NOTE - CLINICAL SUMMARY MEDICAL DECISION MAKING FREE TEXT BOX
47M with viral illness. will swab for flu/covid/rsv and strep. decadron and ibuprofen now. well appearing. will dc home with return precautions.

## 2023-09-11 NOTE — ED STATDOCS - BIRTH SEX
Dx with SBO. Patient has PMH of multiple SBOs. Currently has NG tube on low intermittent suction.
Male

## 2023-10-08 NOTE — ED STATDOCS - CHIEF COMPLAINT
Hospitalist Note Admit Date:  11/3/2019  5:22 PM  
Name:  Dion Oneill Age:  64 y.o. 
:  1943 MRN:  [id number] PCP:  Sen Sood MD 
Treatment Team: Attending Provider: Gopal Campos DO; Consulting Provider: Courtney Preston MD; Consulting Provider: Pantera Lima MD; Utilization Review: Linda Sheikh RN; Occupational Therapist: Odessa Narvaez OT; Care Manager: Angela Truong RN 
 
HPI/Subjective:  
 
Mr. Marlene De La Torre is a 65 yo male with PMH of CKD 4 (declines dialysis) , HTN, DM2, DVT 10,2019 on eliquis admitted with RLE cellulitis. He has been started on oral clindamycin pending IV access placement. He is a difficult stick for access and unable to have PICC due to CKD. IR consulted for tunnel cath and then cans tart vancomycin/ zosyn. BC pending. Nephrology following. Vascular consulted due to PVD and current cellulitis with recommendations for wound care. Arterial TONY ordered. discharge plans pending to home 19 has wrist pain and edema limiting his ability to self feed,  RLE with ongoing infection, no dyspnea or cough Objective:  
 
Patient Vitals for the past 24 hrs: 
 Temp Pulse Resp BP SpO2  
19 1436 98.1 °F (36.7 °C) 65 16 156/73 97 % 19 1124 97.9 °F (36.6 °C) (!) 54 12 131/53 98 % 19 0731 98.3 °F (36.8 °C) (!) 58 16 129/61 100 % 19 0236 97.9 °F (36.6 °C) (!) 58 16 151/84 100 % 19 0000 98.1 °F (36.7 °C) 80 18 148/78 99 % 19 2047 97.5 °F (36.4 °C) 70 18 196/88 99 % 19 2004 98 °F (36.7 °C) 69 18 186/81 98 % 19 1719 98.2 °F (36.8 °C) 74 16 117/56 98 % Oxygen Therapy O2 Sat (%): 97 % (19) O2 Device: Room air (19) Estimated body mass index is 22.36 kg/m² as calculated from the following: 
  Height as of this encounter: 6' (1.829 m). Weight as of this encounter: 74.8 kg (164 lb 14.4 oz). Intake/Output Summary (Last 24 hours) at 11/4/2019 1449 Last data filed at 11/4/2019 0800 Gross per 24 hour Intake 480 ml Output 900 ml Net -420 ml  
   
*Note that automatically entered I/Os may not be accurate; dependent on patient compliance with collection and accurate  by techs. General:    Well nourished. Alert. No distress CV:   RRR. No murmur, rub, or gallop. 1+ edema with venous  Stasis Lungs:   CTAB. No wheezing, rhonchi, or rales. anterior Abdomen:   Soft, nontender, nondistended. Skin:     Erythema right anterior shin, venous stasis dermatitis Neuro:  No gross focal deficits Data Review: 
I have reviewed all labs, meds, and studies from the last 24 hours: 
 
Recent Results (from the past 24 hour(s)) CBC WITH AUTOMATED DIFF Collection Time: 11/03/19  5:25 PM  
Result Value Ref Range WBC 5.0 4.3 - 11.1 K/uL  
 RBC 5.25 4.23 - 5.6 M/uL  
 HGB 13.3 (L) 13.6 - 17.2 g/dL HCT 41.3 41.1 - 50.3 % MCV 78.7 (L) 79.6 - 97.8 FL  
 MCH 25.3 (L) 26.1 - 32.9 PG  
 MCHC 32.2 31.4 - 35.0 g/dL  
 RDW 16.2 (H) 11.9 - 14.6 % PLATELET 013 032 - 553 K/uL MPV 13.3 (H) 9.4 - 12.3 FL ABSOLUTE NRBC 0.00 0.0 - 0.2 K/uL  
 DF AUTOMATED NEUTROPHILS 73 43 - 78 % LYMPHOCYTES 16 13 - 44 % MONOCYTES 9 4.0 - 12.0 % EOSINOPHILS 0 (L) 0.5 - 7.8 % BASOPHILS 1 0.0 - 2.0 % IMMATURE GRANULOCYTES 0 0.0 - 5.0 %  
 ABS. NEUTROPHILS 3.7 1.7 - 8.2 K/UL  
 ABS. LYMPHOCYTES 0.8 0.5 - 4.6 K/UL  
 ABS. MONOCYTES 0.5 0.1 - 1.3 K/UL  
 ABS. EOSINOPHILS 0.0 0.0 - 0.8 K/UL  
 ABS. BASOPHILS 0.0 0.0 - 0.2 K/UL  
 ABS. IMM. GRANS. 0.0 0.0 - 0.5 K/UL METABOLIC PANEL, COMPREHENSIVE Collection Time: 11/03/19  5:25 PM  
Result Value Ref Range Sodium 145 136 - 145 mmol/L Potassium 3.7 3.5 - 5.1 mmol/L Chloride 110 (H) 98 - 107 mmol/L  
 CO2 23 21 - 32 mmol/L Anion gap 12 7 - 16 mmol/L Glucose 429 (H) 65 - 100 mg/dL  BUN 61 (H) 8 - 23 MG/DL  
 Creatinine 4.72 (H) 0.8 - 1.5 MG/DL  
 GFR est AA 16 (L) >60 ml/min/1.73m2 GFR est non-AA 13 (L) >60 ml/min/1.73m2 Calcium 8.0 (L) 8.3 - 10.4 MG/DL Bilirubin, total 0.4 0.2 - 1.1 MG/DL  
 ALT (SGPT) 10 (L) 12 - 65 U/L  
 AST (SGOT) 22 15 - 37 U/L Alk. phosphatase 78 50 - 136 U/L Protein, total 7.4 6.3 - 8.2 g/dL Albumin 2.2 (L) 3.2 - 4.6 g/dL Globulin 5.2 (H) 2.3 - 3.5 g/dL A-G Ratio 0.4 (L) 1.2 - 3.5 PROCALCITONIN Collection Time: 11/03/19  5:25 PM  
Result Value Ref Range Procalcitonin 0.5 ng/mL POC LACTIC ACID Collection Time: 11/03/19  7:00 PM  
Result Value Ref Range Lactic Acid (POC) 1.06 0.5 - 1.9 mmol/L  
CULTURE, BLOOD Collection Time: 11/03/19  9:25 PM  
Result Value Ref Range Special Requests: RIGHT 
ARM Culture result: NO GROWTH AFTER 10 HOURS    
HEMOGLOBIN A1C WITH EAG Collection Time: 11/03/19  9:25 PM  
Result Value Ref Range Hemoglobin A1c 8.4 (H) 4.8 - 6.0 % Est. average glucose 194 mg/dL PROTHROMBIN TIME + INR Collection Time: 11/03/19  9:25 PM  
Result Value Ref Range Prothrombin time 13.1 11.7 - 14.5 sec INR 1.0    
CULTURE, BLOOD Collection Time: 11/03/19  9:33 PM  
Result Value Ref Range Special Requests: RIGHT 
FOREARM Culture result: NO GROWTH AFTER 10 HOURS    
GLUCOSE, POC Collection Time: 11/03/19  9:35 PM  
Result Value Ref Range Glucose (POC) 504 (HH) 65 - 100 mg/dL GLUCOSE, POC Collection Time: 11/04/19  1:58 AM  
Result Value Ref Range Glucose (POC) 386 (H) 65 - 100 mg/dL CBC WITH AUTOMATED DIFF Collection Time: 11/04/19  3:22 AM  
Result Value Ref Range WBC 3.7 (L) 4.3 - 11.1 K/uL  
 RBC 4.30 4.23 - 5.6 M/uL  
 HGB 10.8 (L) 13.6 - 17.2 g/dL HCT 33.4 (L) 41.1 - 50.3 % MCV 77.7 (L) 79.6 - 97.8 FL  
 MCH 25.1 (L) 26.1 - 32.9 PG  
 MCHC 32.3 31.4 - 35.0 g/dL  
 RDW 16.2 (H) 11.9 - 14.6 % PLATELET 664 (L) 717 - 450 K/uL MPV Unable to calculate. Recommend adding IPF. 9.4 - 12.3 FL ABSOLUTE NRBC 0.00 0.0 - 0.2 K/uL  
 DF AUTOMATED NEUTROPHILS 50 43 - 78 % LYMPHOCYTES 37 13 - 44 % MONOCYTES 12 4.0 - 12.0 % EOSINOPHILS 1 0.5 - 7.8 % BASOPHILS 1 0.0 - 2.0 % IMMATURE GRANULOCYTES 0 0.0 - 5.0 %  
 ABS. NEUTROPHILS 1.9 1.7 - 8.2 K/UL  
 ABS. LYMPHOCYTES 1.4 0.5 - 4.6 K/UL  
 ABS. MONOCYTES 0.4 0.1 - 1.3 K/UL  
 ABS. EOSINOPHILS 0.0 0.0 - 0.8 K/UL  
 ABS. BASOPHILS 0.0 0.0 - 0.2 K/UL  
 ABS. IMM. GRANS. 0.0 0.0 - 0.5 K/UL METABOLIC PANEL, BASIC Collection Time: 11/04/19  3:22 AM  
Result Value Ref Range Sodium 146 (H) 136 - 145 mmol/L Potassium 3.0 (L) 3.5 - 5.1 mmol/L Chloride 111 (H) 98 - 107 mmol/L  
 CO2 25 21 - 32 mmol/L Anion gap 10 7 - 16 mmol/L Glucose 363 (H) 65 - 100 mg/dL BUN 60 (H) 8 - 23 MG/DL Creatinine 4.70 (H) 0.8 - 1.5 MG/DL  
 GFR est AA 16 (L) >60 ml/min/1.73m2 GFR est non-AA 13 (L) >60 ml/min/1.73m2 Calcium 7.2 (L) 8.3 - 10.4 MG/DL  
GLUCOSE, POC Collection Time: 11/04/19  3:27 AM  
Result Value Ref Range Glucose (POC) 292 (H) 65 - 100 mg/dL GLUCOSE, POC Collection Time: 11/04/19  5:45 AM  
Result Value Ref Range Glucose (POC) 134 (H) 65 - 100 mg/dL GLUCOSE, POC Collection Time: 11/04/19  7:03 AM  
Result Value Ref Range Glucose (POC) 93 65 - 100 mg/dL GLUCOSE, POC Collection Time: 11/04/19  2:08 PM  
Result Value Ref Range Glucose (POC) 109 (H) 65 - 100 mg/dL All Micro Results Procedure Component Value Units Date/Time CULTURE, BLOOD [[id number]] Collected:  11/03/19 2125 Order Status:  Completed Specimen:  Blood Updated:  11/04/19 3325 Special Requests: --     
  RIGHT 
ARM Culture result: NO GROWTH AFTER 10 HOURS     
 CULTURE, BLOOD [[id number]] Collected:  11/03/19 2133 Order Status:  Completed Specimen:  Blood Updated:  11/04/19 2341   Special Requests: --     
  RIGHT 
FOREARM 
  
 Culture result: NO GROWTH AFTER 10 HOURS No results found for this visit on 11/03/19. Current Meds: 
Current Facility-Administered Medications Medication Dose Route Frequency  alcohol 62% (NOZIN) nasal  1 Ampule  1 Ampule Topical Q12H  piperacillin-tazobactam (ZOSYN) 3.375 g in 0.9% sodium chloride (MBP/ADV) 100 mL  3.375 g IntraVENous Q8H  
 acetaminophen (TYLENOL) tablet 650 mg  650 mg Oral Q4H PRN  
 HYDROcodone-acetaminophen (NORCO) 5-325 mg per tablet 1 Tab  1 Tab Oral Q4H PRN  
 naloxone (NARCAN) injection 0.4 mg  0.4 mg IntraVENous PRN  
 ondansetron (ZOFRAN) injection 4 mg  4 mg IntraVENous Q4H PRN  
 tuberculin injection 5 Units  5 Units IntraDERMal ONCE  
 amLODIPine (NORVASC) tablet 10 mg  10 mg Oral DAILY  cloNIDine (CATAPRES) 0.1 mg/24 hr patch 1 Patch  1 Patch TransDERmal Q7D  
 hydrALAZINE (APRESOLINE) tablet 100 mg  100 mg Oral TID  pantoprazole (PROTONIX) tablet 40 mg  40 mg Oral DAILY  metoprolol succinate (TOPROL-XL) XL tablet 50 mg  50 mg Oral DAILY  pravastatin (PRAVACHOL) tablet 40 mg  40 mg Oral QHS  sodium bicarbonate tablet 650 mg  650 mg Oral TID  torsemide (DEMADEX) tablet 40 mg  40 mg Oral DAILY  traMADol (ULTRAM) tablet 50 mg  50 mg Oral Q6H PRN  
 heparin 25,000 units in dextrose 500 mL infusion  12-25 Units/kg/hr IntraVENous CONTINUOUS  
 insulin lispro (HUMALOG) injection   SubCUTAneous AC&HS  insulin glargine (LANTUS) injection 15 Units  15 Units SubCUTAneous QHS Other Studies (last 24 hours): Ankle Brachial Index Result Date: 11/4/2019 HISTORY: 64years of age Male with right leg open wound. EXAM/TECHNIQUE: Ankle/Brachial Index Measurement. Blood Pressure was measured in the upper and lower extremities. Brachial artery, Posterior Tibial artery, Dorsalis Pedis artery, and Toe Pressures were obtained. Ankle/Brachial Indices were calculated. COMPARISON: None.  FINDINGS: Measurements with Blood pressures: (in mmHg): Right brachial: 143 Left brachial: 143 Right lower extremity: Max between dorsalis pedis and posterior tibial arteries: Unable to be accurately obtained due to being greater than 254 mmHg Toe pressure: 156 Right TONY: Unable to be obtained Right TBI: 1.09 Left lower extremity: Max between dorsalis pedis and posterior tibial arteries: 207 Toe pressure: 108 Left TONY: 1.45 Left TBI: 0.76 IMPRESSION:   Right TONY: Unable to be obtained, as above. However, there was a normal right TBI of 1.09. Left TONY: Normal.  
 
 
Assessment and Plan:  
 
Hospital Problems as of 11/4/2019 Date Reviewed: 2/1/2019 Codes Class Noted - Resolved POA  
 ESRD (end stage renal disease) ([facility] [address] ICD-10-CM: N18.6 ICD-9-CM: 585.6  11/3/2019 - Present Unknown * (Principal) Open wound of skin ICD-10-CM: T14. Tiny Heart ICD-9-CM: 879.8  11/3/2019 - Present Unknown DVT (deep venous thrombosis) (HCC) ICD-10-CM: I82.409 ICD-9-CM: 453.40  11/3/2019 - Present Unknown Chronic systolic congestive heart failure (HCC) (Chronic) ICD-10-CM: M10.62 ICD-9-CM: 428.22, 428.0  9/23/2016 - Present Yes Type II diabetes mellitus with nephropathy (HCC) (Chronic) ICD-10-CM: E11.21 
ICD-9-CM: 250.40, 583.81  9/22/2016 - Present Yes Plan: 
· RLE cellulitis: needs IV access, taking oral clinda pending either ability to place peripheral IV vs IR to place TCC- wont need TCC if peripheral IV successful, vancomycin and zosyn ordered for once IV placed and can stop clinda at that point, followup BC , needs wound care,  Addendum: got peripheral IV, unable to restart eliquis due to CKD and needs IV heparin and coumadin, discussed with pharmacy, also zosyn isnt compatible with the IV heparin thus changed to IV rocephin and oral flagyl plus vancomycin · CKD4: stable, no plans for HD due to his current wishes, continued demadex and bicarb · PVD: seen by vascular and recommends cellulitis treatment and wound care, TONY ordered · DM2: resumed lantus and SSI · DVT: eliquis stopped on admit and placed on IV heparin/coumadin bridge · Pancytopenia: followup CBC · Hypokalemia: followup BMP 
· HTN: continued norvasc/ clonidine/ hydralazine/ metoprolol DC planning/Dispo:  Pending , PT/OT Diet:  DIET RENAL 
DVT ppx:  IV hepain Signed: Gladys Weston MD 
 The patient is a 46y year old Male complaining of eye foreign body. bilateral UE Active ROM was WFL  (within functional limits)

## 2025-01-06 NOTE — ED ADULT NURSE NOTE - LANGUAGE ASSISTANCE NEEDED
Yes-Patient/Caregiver accepts free interpretation services... [[email or web address]] : Pre-employment physical - will obtain titers and drug screen